# Patient Record
Sex: MALE | Race: WHITE | Employment: FULL TIME | URBAN - METROPOLITAN AREA
[De-identification: names, ages, dates, MRNs, and addresses within clinical notes are randomized per-mention and may not be internally consistent; named-entity substitution may affect disease eponyms.]

---

## 2017-07-26 ENCOUNTER — HOSPITAL ENCOUNTER (OUTPATIENT)
Dept: CT IMAGING | Age: 52
Discharge: HOME | End: 2017-07-26
Attending: ORTHOPAEDIC SURGERY | Admitting: ORTHOPAEDIC SURGERY

## 2017-07-26 DIAGNOSIS — M54.12 RADICULOPATHY OF CERVICAL REGION: ICD-10-CM

## 2017-07-26 DIAGNOSIS — M54.12 BRACHIAL NEURITIS OR RADICULITIS NOS: ICD-10-CM

## 2018-08-23 ENCOUNTER — HOSPITAL ENCOUNTER (OUTPATIENT)
Age: 53
Discharge: HOME | End: 2018-08-23
Payer: COMMERCIAL

## 2018-08-23 LAB — POTASSIUM SERPL-SCNC: 3.5 MMOL/L (ref 3.5–5.1)

## 2018-08-23 PROCEDURE — 36415 COLL VENOUS BLD VENIPUNCTURE: CPT

## 2018-08-23 PROCEDURE — 84132 ASSAY OF SERUM POTASSIUM: CPT

## 2019-04-30 ENCOUNTER — OFFICE VISIT (OUTPATIENT)
Dept: ORTHOPEDIC SURGERY | Age: 54
End: 2019-04-30
Payer: COMMERCIAL

## 2019-04-30 VITALS
DIASTOLIC BLOOD PRESSURE: 140 MMHG | SYSTOLIC BLOOD PRESSURE: 176 MMHG | WEIGHT: 220 LBS | BODY MASS INDEX: 29.8 KG/M2 | HEIGHT: 72 IN | HEART RATE: 90 BPM

## 2019-04-30 DIAGNOSIS — M75.22 BICEPS TENDONITIS ON LEFT: Primary | ICD-10-CM

## 2019-04-30 DIAGNOSIS — M25.512 LEFT SHOULDER PAIN, UNSPECIFIED CHRONICITY: ICD-10-CM

## 2019-04-30 PROCEDURE — 99204 OFFICE O/P NEW MOD 45 MIN: CPT | Performed by: ORTHOPAEDIC SURGERY

## 2019-04-30 NOTE — PROGRESS NOTES
[address]  Left  Upper Extremity Pain    Chief Complaint    Shoulder Pain [facility]FIN[facility] NP left shoulder)      DOS: August 24, 2018 left shoulder labral debridement chondroplasty with subacromial decompression as well as distal clavicle excision performed by Dr. Riana Pat at [facility]    Pain Assessment  Location of Pain: Shoulder  Location Modifiers: Left  Severity of Pain: 8  Quality of Pain: Sharp, Aching  Duration of Pain: Persistent  Frequency of Pain: Intermittent  Aggravating Factors: Other (Comment)(raise/rotate)  Limiting Behavior: Yes  Result of Injury: Yes  Work-Related Injury: Yes  Are there other pain locations you wish to document?: No    History of Present Illness:  Coy Griffiths is a 48 y.o. RHD male  presenting today for a second opinion evaluation for sequelae of a work related injury and subsequent surgery. 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 He was initially seen at [address] [place] [address] and had complaints of both neck and shoulder pain. He had a trial of physical therapy and NSAIDs, but his pain persisted. It appeared at the time that his neck was most painful. He had had previous cervical spine surgery by Dr. Mata Ledesma at [address] [place] [address] and so initially he was referred there. Dr. Mata Ledesma felt there was nothing he could do about this cervical spine. He then saw Dr. Olga Ac for diagnosed a persisten pseudarthrosis and recommended revision surgery. This was subsequently performed. He had residual left shoulder pain and was referred to Dr. Riana Pat. An MRI demonstrated some tendinosis and AC joint arthritis and so he underwent a left shoulder arthroscopic surgery having failed conservative measures. The patient reports that after the surgery continue to have pain localizing to the front of his left shoulder.  This persisted despite extensive rehab as well as repeat corticosteroid injections into the bicipital groove that were ultrasound guided. He continued to have pain. He then began noticing some snapping on the anterior aspect of his shoulder particularly with external rotation of the arm. To date he cannot sleep on that side. On December 20, 2018 a repeat MRI postoperatively was performed and he was diagnosing him with rotator cuff tendinosis as well as postsurgical changes within the glenohumeral joint as well as some fraying of the upper border of the subscapularis. Dr. Gerald Becerril recommended no further intervention. The patient presents today because he has requested a second opinion. This has been arranged through the bureau of workman's comp. He has some residual numbness particularly distal to the elbow that had persisted through these cervical spine surgeries, although it has not worsened. The patient has been instructed to avoid lifting more than 10 pounds but is continued to do home exercises to maintain his range of motion but he does so with some pain particularly at the endpoints of his excursion. Actively he has quite a bit of difficulty lifting above the horizontal.       Medical History:    Musculoskeletal ROS: positive for - joint pain and joint stiffness      Patient's medications, allergies, past medical, surgical, social and family histories were reviewed and updated as appropriate.     Past Medical History:   Diagnosis Date    Hypertension       Past Surgical History:   Procedure Laterality Date    NECK SURGERY      x2    SHOULDER SURGERY Left 08/25/2018     Social History     Socioeconomic History    Marital status: Single     Spouse name: Not on file    Number of children: Not on file    Years of education: Not on file    Highest education level: Not on file   Occupational History    Not on file   Social Needs    Financial resource strain: Not on file    Food insecurity:     Worry: Not on file Inability: Not on file    Transportation needs:     Medical: Not on file     Non-medical: Not on file   Tobacco Use    Smoking status: Never Smoker    Smokeless tobacco: Never Used   Substance and Sexual Activity    Alcohol use: Yes    Drug use: Never    Sexual activity: Not on file   Lifestyle    Physical activity:     Days per week: Not on file     Minutes per session: Not on file    Stress: Not on file   Relationships    Social connections:     Talks on phone: Not on file     Gets together: Not on file     Attends Protestant service: Not on file     Active member of club or organization: Not on file     Attends meetings of clubs or organizations: Not on file     Relationship status: Not on file    Intimate partner violence:     Fear of current or ex partner: Not on file     Emotionally abused: Not on file     Physically abused: Not on file     Forced sexual activity: Not on file   Other Topics Concern    Not on file   Social History Narrative    Not on file       No Known Allergies  No current outpatient medications on file prior to visit. No current facility-administered medications on file prior to visit.           Review of Systems:  A 14 point review of systems available in the scanned medical record, dated 4/30/2019  , under the media tab, as documented by the patient. The review is negative with the exception of those things mentioned in the HPI and Past Medical History. Vital Signs:  Vitals:    04/30/19 1353   BP: (!) 176/140   Pulse: 90       General Exam:   Well nourished, and groomed. Awake, Alert, Oriented to Person/Place/Time  No acute distress    Upper Extremity:    Left shoulder exam    Inspection:  No gross deformities, periscapular musculature is symmetry without atrophy, no obvious winging. Surgical incisions are healed     Palpation: He has no tenderness along the posterior capsule. He has no tenderness along the glenohumeral joint anteriorly. No a.c. joint tenderness.   He does exhibit some tenderness over the anterior rotator cuff as well as exquisite tenderness over the bicipital groove. Active/Passive ROM:     Actively he can only forward elevate to approximately 130° and abduction to 90°. Passively he can be improved to 150° roughly in both planes. External rotation with arm at the side is 50°. This can be improved passively to 55°. Internal rotation is to midline sacrum. Cross body adduction is limited to approximately 10 cm above the chest in addition he has pain particularly over the bicipital groove during this maneuver. Strength: 4-/5 strength testing of Supra and infraspinatus. 4+/5 subscap    Stability: negative sulcus sign, no evidence of instability    Neurovascular:   2+ Radial pulses with capillary refill brisk <2 seconds. 2/2 sensation to light touch in C5-T1 distributions tested. Special Tests: Positive impingement signs. Positive Neer's and Hawkin. Positive Ramy's. Positive Riverdale's. Positive speeds. Positive Yergason's. Right comparison shoulder exam    Inspection:  No gross deformities, periscapular musculature is symmetrical without atrophy, no obvious winging    Palpation: Nontender to palpation about the acromioclavicular joint, rotator cuff footprint or over the biceps groove, or any other bony architecture    Active/Passive ROM: full in forward flexion, abduction, external rotation with the elbow at the side, and internal rotation    Strength: 5/5 strength testing of deltoid, supraspinatus, infraspinatus, teres minor, and subscapularis    Stability: negative sulcus sign, no evidence of instability    Neurovascular: Neurovascularly intact      Imaging:     AP, scapular Y, axillary lateral of the left shoulder demonstrates some mild glenohumeral arthritic changes as well as postsurgical changes involving resection of the a.c. joint with some residual superior osteophyte. Postsurgical changes involving the subacromial space.   No acute fracture dislocation    Multisequence multiplanar MRI dated on December 20, 2018 demonstrated some postsurgical changes involving the superior posterior and anterior labrum as well as the upper border of the subscapularis consistent with prior surgical history. There is biceps tendinosis. Considerable amount of fluid within the midportion and distal biceps as well as edema within the tendon itself. Proximally the tendon is medialized and possibly approached over the medial aspect of the bicipital groove. The cuff tendinosis and continuing a.c. joint arthritis and inflammation though post-surgical changes are present    Office Procedures:  Orders Placed This Encounter   Procedures    XR SHOULDER LEFT (MIN 2 VIEWS)     Standing Status:   Future     Number of Occurrences:   1     Standing Expiration Date:   4/30/2020     Order Specific Question:   Reason for exam:     Answer:   pain       Assessment :  Jay Jay A Advance residual biceps tendinopathy with possible degenerative/attritional SLAP tear. This is not likely to improve with additional conservative measures. He is candidate for arthroscopy and open biceps tenodesis of the left shoulder    Impression:  Encounter Diagnoses   Name Primary?  Left shoulder pain, unspecified chronicity     Biceps tendonitis on left Yes         Plan:      1) Recommend revision surgery involving open sub-pec biceps tenodesis, revision DCE, debridement, and possibly subscap repair  2) Will send our results to work comp for approval  3) Patient will require 3-4 months of physical therapy followed by work conditioning at the tail end for approximately 4-8 weeks. 4) Work restrictions presently include sedentary work with lifting no more than 5 pounds. Greater than 45 minutes was expended reviewing all available records and discussing treatment options and timeline to recovery as well as alternatives to revision surgery. Viv Blancas MD  Fellow, Orthopedic Sports [address] [place] [address]  Date:    4/30/2019    The encounter with Irlanda Fernando was supervised by Dr Idania Ivy, who personally examined the patient and reviewed the plan. This dictation was performed with a verbal recognition program (DRAGON) and it was checked for errors. It is possible that there are still dictated errors within this office note. If so, please bring any errors to my attention for an addendum. All efforts were made to ensure that this office note is accurate.   ________________  I was physically present and personally supervised the Orthopaedic Sports Medicine Fellow in the evaluation and development of a treatment plan for this patient. I personally interviewed the patient and performed a physical examination. In addition, I discussed the patient's condition and treatment options with them. I have also reviewed and agree with the past medical, family and social history unless otherwise noted. All of the patient's questions were answered. Jojo Ivy MD, PhD  4/30/2019

## 2019-05-08 ENCOUNTER — TELEPHONE (OUTPATIENT)
Dept: ORTHOPEDIC SURGERY | Age: 54
End: 2019-05-08

## 2019-05-08 NOTE — TELEPHONE ENCOUNTER
WC WORKABILITY NOTE was not completed at the time of the office visit 4/30/19 and is now being requested by Colette  . Notified Sreekanth cannon/Dr. Anna[facility] clinic    Once notified the Workability Note has been completed I will forward to Patricia.

## 2019-05-22 ENCOUNTER — TELEPHONE (OUTPATIENT)
Dept: ORTHOPEDIC SURGERY | Age: 54
End: 2019-05-22

## 2019-05-22 NOTE — LETTER
[facility]  Surgery Precert & Billing Form:    DEMOGRAPHICS:                                                                                                       Patient Name:  Coy Griffiths  Patient :  1965   Patient SS#:      Patient Phone:  [phone] (home) [phone] (work) Alt. Patient Phone:    Patient Address:  [address] [place] [address]    PCP:  No primary care provider on file. Insurance: Work comp     DIAGNOSIS & PROCEDURE:                                                                                      Diagnosis:   Biceps tendonitis on left  - Primary M75.22    Left shoulder pain, unspecified chronicity  M25.512        Operation: left    SURGERY  INFORMATION  Date of Surgery:      Location:       Type:    Outpatient  23 hour hold:  No  Surgeon:        Kalyn Shore.  Mary Garcia MD         19     BILLING INFORMATION:                                                                                                Physician Procedure                                            CPT Codes                      PA, or Fellow Procedure                                      CPT Codes                      Precert information:

## 2019-05-22 NOTE — TELEPHONE ENCOUNTER
PATIENT CALLED STATING HE NEEDS A LETTER FROM DR. NOYOLA TO [facility] FOR HIM TO HAVE SURGERY.  PATIENT CAN BE REACHED AT PH# [phone]

## 2019-06-11 ENCOUNTER — OFFICE VISIT (OUTPATIENT)
Dept: ORTHOPEDIC SURGERY | Age: 54
End: 2019-06-11
Payer: COMMERCIAL

## 2019-06-11 VITALS
WEIGHT: 220.02 LBS | BODY MASS INDEX: 29.8 KG/M2 | HEIGHT: 72 IN | SYSTOLIC BLOOD PRESSURE: 165 MMHG | DIASTOLIC BLOOD PRESSURE: 115 MMHG | HEART RATE: 75 BPM

## 2019-06-11 DIAGNOSIS — M25.512 LEFT SHOULDER PAIN, UNSPECIFIED CHRONICITY: ICD-10-CM

## 2019-06-11 DIAGNOSIS — M75.22 BICEPS TENDONITIS ON LEFT: Primary | ICD-10-CM

## 2019-06-11 PROCEDURE — 99213 OFFICE O/P EST LOW 20 MIN: CPT | Performed by: ORTHOPAEDIC SURGERY

## 2019-06-11 RX ORDER — CETIRIZINE HYDROCHLORIDE 10 MG/1
10 TABLET ORAL DAILY
COMMUNITY
End: 2019-07-25

## 2019-06-11 RX ORDER — LISINOPRIL 10 MG/1
1 TABLET ORAL
COMMUNITY
End: 2019-07-25

## 2019-06-11 NOTE — PROGRESS NOTES
[facility]  History and Physical  Shoulder Pain    Date:  2019    Name:  Bharath Noyola  Address:  [address]    :  1965      Age:   48 y.o.    SSN:  [id number]      Medical Record Number:  [id number]    Reason for Visit:    Pre-op Exam (Left shoulder)      HPI:   Bharath Noyola is a 48 y.o. male who presents to our office today complaining of  left shoulder pain. There is a request for left shoulder surgery with his Workmen's Comp. which they have finally improved. Patient presents today stating that he continues to have persistent symptoms of pain. He denies any new injury since his last visit. 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 He was initially seen at [address] [place] [address] and had complaints of both neck and shoulder pain. He had a trial of physical therapy and NSAIDs, but his pain persisted. It appeared at the time that his neck was most painful. He had had previous cervical spine surgery by Dr. Justine Snyder at [facility] and so initially he was referred there. Dr. Justine Snyder felt there was nothing he could do about this cervical spine. He then saw Dr. Jace Palacios for diagnosed a persistent pseudarthrosis and recommended revision surgery. This was subsequently performed. He had residual left shoulder pain and was referred to Dr. Dima Esparza. An MRI demonstrated some tendinosis and AC joint arthritis and so he underwent a left shoulder arthroscopic surgery having failed conservative measures. The patient reports that after the surgery continue to have pain localizing to the front of his left shoulder. This persisted despite extensive rehab as well as repeat corticosteroid injections into the bicipital groove that were ultrasound guided. He continued to have pain.   He groove and rotator cuff. Active Range of Motion: Forward elevation of 120 with pain past that, abduction of 110, external rotation with elbow at the side 35, internal rotation to the back is T12    Passive Range of Motion: Passively forward elevation can be further increased to 130. Strength:  +4/5 supraspinatus with pain inhibition. 5/5 external rotation with resistance. Special Tests:positive pain over the biceps with crossarm addiction. No Darío muscle deformity. Neurovascular: Sensation to light touch is intact, no motor deficits, palpable radial pulses 2+    Neuro: alert. oriented  Eyes: Extra-ocular muscles intact  Mouth: Oral mucosa moist. No perioral lesions  Pulm: Respirations unlabored and regular. Skin: warm, well perfused    Additional Examinations:    Examination of the contralateral extremity does not show any tenderness, deformity or injury. Range of motion is unremarkable. There is no gross instability. There are no rashes, ulcerations or lesions. Strength and tone are normal.    Assessment:  Radhames Bryant is a 48y.o. year old male with left shoulder pain related to with chronic biceps tendinosis. Impression:  Encounter Diagnoses   Name Primary?  Biceps tendonitis on left Yes    Left shoulder pain, unspecified chronicity        Office Procedures:  No orders of the defined types were placed in this encounter. Plan:   We discussed the surgery in full detail. We recommend left shoulder arthroscopic vs. open biceps tenodesis, possible distal clavicle excision, possible revision subscapularis repair. We recommend that he wait until after his [place] trip. Risks, benefits and potential complications of arthroscopic shoulder surgery were discussed with the patient.   Risks discussed include but are not limited to bleeding, infection, anesthetic risk, injury to nerves and blood vessels, deep vein thrombosis, residual stiffness and weakness, and the need for revision surgery. The patient also understands that anesthetic risks include cardiopulmonary issues, drug reactions and even death. The patient voices an understanding of the importance of physical therapy and home exercises after surgery. All questions were answered and written informed consent for surgery was obtained today. 6/13/2019  2:50 PM      Perla hCacon PA-C  Orthopaedic Sports Medicine Physician Assistant    During this examination, Perla ALBERTO PA-C, functioned as a scribe for Dr. Juana Quiroz. This dictation was performed with a verbal recognition program (DRAGON) and it was checked for errors. It is possible that there are still dictated errors within this office note. If so, please bring any errors to my attention for an addendum. All efforts were made to ensure that this office note is accurate.  _________________  I, Dr. Juana Quiroz, personally performed the services described in this documentation as described by Perla Chacon PA-C in my presence, and it is both accurate and complete. Jojo Blake MD, PhD  6/11/2019

## 2019-06-13 ENCOUNTER — TELEPHONE (OUTPATIENT)
Dept: ORTHOPEDIC SURGERY | Age: 54
End: 2019-06-13

## 2019-06-13 NOTE — TELEPHONE ENCOUNTER
FAXED [facility] [address] [facility] CARDIOVASCULAR DE [place] [address]IB[address] HUA WOODALL) MEDICAL RECORDS FROM  MAY/JUNE 2019 TO Apollo Lin @ [phone]. ALSO RECEIVED APS FROM [employer]. CAN NOT BE FILLED OUT UNTIL APPROVAL SX DATE FROM [facility].       PUT IN TRAY

## 2019-06-13 NOTE — TELEPHONE ENCOUNTER
GAVE [facility] CENTRO CARDIOVASCULAR DE [place] [address] HUA WOODALL) MEDICAL RECORDS FROM 7/7/16 TO THE PRESENT TO Marco Mncair TO SCAN INTO MRO FOR Arden Oakes. REQUESTED BILLING FROM [facility] BILLING.

## 2019-06-27 ENCOUNTER — TELEPHONE (OUTPATIENT)
Dept: ORTHOPEDIC SURGERY | Age: 54
End: 2019-06-27

## 2019-07-25 ENCOUNTER — TELEPHONE (OUTPATIENT)
Dept: ORTHOPEDIC SURGERY | Age: 54
End: 2019-07-25

## 2019-07-25 ENCOUNTER — OFFICE VISIT (OUTPATIENT)
Dept: ORTHOPEDIC SURGERY | Age: 54
End: 2019-07-25
Payer: COMMERCIAL

## 2019-07-25 VITALS
HEIGHT: 72 IN | WEIGHT: 220.02 LBS | HEART RATE: 73 BPM | SYSTOLIC BLOOD PRESSURE: 144 MMHG | DIASTOLIC BLOOD PRESSURE: 91 MMHG | BODY MASS INDEX: 29.8 KG/M2

## 2019-07-25 DIAGNOSIS — M25.512 LEFT SHOULDER PAIN, UNSPECIFIED CHRONICITY: ICD-10-CM

## 2019-07-25 DIAGNOSIS — M75.22 BICEPS TENDONITIS ON LEFT: Primary | ICD-10-CM

## 2019-07-25 PROBLEM — M96.0 PSEUDARTHROSIS FOLLOWING SPINAL FUSION: Status: ACTIVE | Noted: 2017-02-17

## 2019-07-25 PROBLEM — M96.0 PSEUDARTHROSIS AFTER FUSION OR ARTHRODESIS: Status: ACTIVE | Noted: 2017-02-17

## 2019-07-25 PROBLEM — S12.601K: Status: ACTIVE | Noted: 2017-02-06

## 2019-07-25 PROBLEM — Z98.1 HX OF FUSION OF CERVICAL SPINE: Status: ACTIVE | Noted: 2017-02-06

## 2019-07-25 PROBLEM — M54.12 CERVICAL RADICULOPATHY: Status: ACTIVE | Noted: 2017-02-17

## 2019-07-25 PROCEDURE — 99213 OFFICE O/P EST LOW 20 MIN: CPT | Performed by: ORTHOPAEDIC SURGERY

## 2019-07-25 RX ORDER — LISINOPRIL AND HYDROCHLOROTHIAZIDE 12.5; 1 MG/1; MG/1
TABLET ORAL
COMMUNITY
Start: 2019-07-03

## 2019-07-25 NOTE — PROGRESS NOTES
Patient has had no medical changes since last evaluated
both accurate and complete. Jojo Shetty MD PhD  7/25/2019

## 2019-08-01 NOTE — PROGRESS NOTES
[facility] / [facility] ([facility]) Jocelyn Tse, [address]    Acknowledgment of Informed Consent for Surgical or Medical Procedure and Sedation  I agree to allow doctor(s) [id number] [facility] and his/her associates or assistants, including residents and/or other qualified medical practitioner to perform the following medical treatment or procedure and to administer or direct the administration of sedation as necessary:  Procedure(s): LEFT SHOULDER ARTHROSCOPIC VERSUS OPEN BICEPS TENODESIS, POSSIBLE BICEPS TENODESIS, WONG, POSSIBLE REVISION SUBSCAPULARIS  REPAIR  My doctor has explained the following regarding the proposed procedure:   the explanation of the procedure   the benefits of the procedure   the potential problems that might occur during recuperation   the risks and side effects of the procedure which could include but are not limited to severe blood loss, infection, stroke or death   the benefits, risks and side effect of alternative procedures including the consequences of declining this procedure or any alternative procedures   the likelihood of achieving satisfactory results. I acknowledge no guarantee or assurance has been made to me regarding the results. I understand that during the course of this treatment/procedure, unforeseen conditions can occur which require an additional or different procedure. I agree to allow my physician or assistants to perform such extension of the original procedure as they may find necessary. I understand that sedation will often result in temporary impairment of memory and fine motor skills and that sedation can occasionally progress to a state of deep sedation or general anesthesia. I understand the risks of anesthesia for surgery include, but are not limited to, sore throat, hoarseness, injury to face, mouth, or teeth; nausea; headache; injury to blood vessels or nerves; death, brain damage, or paralysis.     I understand that if I

## 2019-08-07 DIAGNOSIS — M25.512 LEFT SHOULDER PAIN, UNSPECIFIED CHRONICITY: Primary | ICD-10-CM

## 2019-08-09 ENCOUNTER — TELEPHONE (OUTPATIENT)
Dept: ORTHOPEDIC SURGERY | Age: 54
End: 2019-08-09

## 2019-08-14 ENCOUNTER — TELEPHONE (OUTPATIENT)
Dept: ORTHOPEDIC SURGERY | Age: 54
End: 2019-08-14

## 2019-08-15 ENCOUNTER — ANESTHESIA EVENT (OUTPATIENT)
Dept: OPERATING ROOM | Age: 54
End: 2019-08-15
Payer: COMMERCIAL

## 2019-08-15 ENCOUNTER — TELEPHONE (OUTPATIENT)
Dept: ORTHOPEDIC SURGERY | Age: 54
End: 2019-08-15

## 2019-08-15 NOTE — PROGRESS NOTES
PRE-OP INSTRUCTIONS FOR THE SURGICAL PATIENT YOU ARE UNABLE TO MAKE CONTACT FOR AN INTERVIEW:      1. Follow instructions for your ARRIVAL TIME as DIRECTED BY YOUR SURGEON. 2. Enter the MAIN entrance located on [employer] and report to the desk. 3. Bring your insurance & prescription card and photo ID with you. You may also be asked to pay a co-pay, as you may want to bring a check or credit card with you. 4. Leave all other valuables at home. 5. Arrange for someone to drive you home and be with you for the first 24 hours after discharge. 6. You must contact your surgeon for Instructions regarding:         - ALL medication instructions, especially if taking blood thinners, aspirin, or diabetic medication.  - IF  there is a change in your physical condition such as a cold, fever, rash, cuts, sores or any other infection, especially near your surgical site. 7. A Pre-op History and Physical for surgery MUST be completed by your Physician or an Urgent Care within 30 days of your procedure date. Please bring a copy with you on the day of your procedure and along with any other testing performed. 8. DO NOT EAT ANYTHING eight hours prior to surgery. May have up to 8 ounces of water 4 hours prior to surgery. 9. No gum, candy, mints, or ice chips day of procedure. 10. Please refrain from drinking alcohol the day before or day of your procedure. 11. Please do not smoke the day of your procedure. 12. Dress in loose, comfortable clothing appropriate for redressing after your procedure. Do not wear jewelry (including body piercings), make-up, fingernail polish, lotion, powders or metal hairclips. 15. Contacts will need to be removed prior to surgery. You may want to bring your            Eye glasses to wear immediately before and after surgery. 14. Dentures will need to be removed before your procedure.    13. Bring cases for your glasses, contacts, dentures, or hearing aids to protect them while you are in surgery. 16. If you use a CPAP, please bring it with you on the day of your procedure. 17. Do not shave or wax for 72 hours prior to procedure near your operative site  18. FOR WOMAN OF CHILDBEARING AGE ONLY- please bring a urine sample with you on day of surgery or make sure we can collect on arrival.    If you have further questions, you may contact us at [phone]    Left instructions on patient's voicemail. Cassie Spear. 8/15/2019 .9:09 AM

## 2019-08-16 ENCOUNTER — ANESTHESIA (OUTPATIENT)
Dept: OPERATING ROOM | Age: 54
End: 2019-08-16
Payer: COMMERCIAL

## 2019-08-16 ENCOUNTER — HOSPITAL ENCOUNTER (OUTPATIENT)
Age: 54
Setting detail: OUTPATIENT SURGERY
Discharge: HOME | End: 2019-08-16
Attending: ORTHOPAEDIC SURGERY | Admitting: ORTHOPAEDIC SURGERY
Payer: COMMERCIAL

## 2019-08-16 VITALS — DIASTOLIC BLOOD PRESSURE: 76 MMHG | SYSTOLIC BLOOD PRESSURE: 119 MMHG | OXYGEN SATURATION: 96 % | TEMPERATURE: 96.8 F

## 2019-08-16 VITALS
SYSTOLIC BLOOD PRESSURE: 165 MMHG | OXYGEN SATURATION: 94 % | WEIGHT: 205 LBS | DIASTOLIC BLOOD PRESSURE: 119 MMHG | HEIGHT: 72 IN | BODY MASS INDEX: 27.77 KG/M2 | HEART RATE: 97 BPM | TEMPERATURE: 97.8 F | RESPIRATION RATE: 12 BRPM

## 2019-08-16 DIAGNOSIS — Z98.890 H/O ARTHROSCOPY OF SHOULDER: Primary | ICD-10-CM

## 2019-08-16 PROCEDURE — 7100000011 HC PHASE II RECOVERY - ADDTL 15 MIN: Performed by: ORTHOPAEDIC SURGERY

## 2019-08-16 PROCEDURE — 7100000010 HC PHASE II RECOVERY - FIRST 15 MIN: Performed by: ORTHOPAEDIC SURGERY

## 2019-08-16 PROCEDURE — 6360000002 HC RX W HCPCS

## 2019-08-16 PROCEDURE — 6360000002 HC RX W HCPCS: Performed by: ANESTHESIOLOGY

## 2019-08-16 PROCEDURE — 3700000000 HC ANESTHESIA ATTENDED CARE: Performed by: ORTHOPAEDIC SURGERY

## 2019-08-16 PROCEDURE — 3700000001 HC ADD 15 MINUTES (ANESTHESIA): Performed by: ORTHOPAEDIC SURGERY

## 2019-08-16 PROCEDURE — 2580000003 HC RX 258: Performed by: ANESTHESIOLOGY

## 2019-08-16 PROCEDURE — 2720000010 HC SURG SUPPLY STERILE: Performed by: ORTHOPAEDIC SURGERY

## 2019-08-16 PROCEDURE — 2709999900 HC NON-CHARGEABLE SUPPLY: Performed by: ORTHOPAEDIC SURGERY

## 2019-08-16 PROCEDURE — 6360000002 HC RX W HCPCS: Performed by: ORTHOPAEDIC SURGERY

## 2019-08-16 PROCEDURE — 64415 NJX AA&/STRD BRCH PLXS IMG: CPT | Performed by: ANESTHESIOLOGY

## 2019-08-16 PROCEDURE — 3600000004 HC SURGERY LEVEL 4 BASE: Performed by: ORTHOPAEDIC SURGERY

## 2019-08-16 PROCEDURE — 2500000003 HC RX 250 WO HCPCS: Performed by: ORTHOPAEDIC SURGERY

## 2019-08-16 PROCEDURE — 2500000003 HC RX 250 WO HCPCS: Performed by: NURSE ANESTHETIST, CERTIFIED REGISTERED

## 2019-08-16 PROCEDURE — 2580000003 HC RX 258: Performed by: ORTHOPAEDIC SURGERY

## 2019-08-16 PROCEDURE — L3650 SO 8 ABD RESTRAINT PRE OTS: HCPCS | Performed by: ORTHOPAEDIC SURGERY

## 2019-08-16 PROCEDURE — 3600000014 HC SURGERY LEVEL 4 ADDTL 15MIN: Performed by: ORTHOPAEDIC SURGERY

## 2019-08-16 PROCEDURE — 7100000001 HC PACU RECOVERY - ADDTL 15 MIN: Performed by: ORTHOPAEDIC SURGERY

## 2019-08-16 PROCEDURE — 6360000002 HC RX W HCPCS: Performed by: NURSE ANESTHETIST, CERTIFIED REGISTERED

## 2019-08-16 PROCEDURE — C1713 ANCHOR/SCREW BN/BN,TIS/BN: HCPCS | Performed by: ORTHOPAEDIC SURGERY

## 2019-08-16 PROCEDURE — 7100000000 HC PACU RECOVERY - FIRST 15 MIN: Performed by: ORTHOPAEDIC SURGERY

## 2019-08-16 PROCEDURE — 6370000000 HC RX 637 (ALT 250 FOR IP): Performed by: ANESTHESIOLOGY

## 2019-08-16 DEVICE — SCREW INTFR L15MM DIA5.5MM W/ SZ 2 FIBERWIRE SUT AND DISP: Type: IMPLANTABLE DEVICE | Site: SHOULDER | Status: FUNCTIONAL

## 2019-08-16 RX ORDER — SCOLOPAMINE TRANSDERMAL SYSTEM 1 MG/1
1 PATCH, EXTENDED RELEASE TRANSDERMAL ONCE
Status: DISCONTINUED | OUTPATIENT
Start: 2019-08-16 | End: 2019-08-16 | Stop reason: HOSPADM

## 2019-08-16 RX ORDER — PROMETHAZINE HYDROCHLORIDE 25 MG/ML
6.25 INJECTION, SOLUTION INTRAMUSCULAR; INTRAVENOUS
Status: DISCONTINUED | OUTPATIENT
Start: 2019-08-16 | End: 2019-08-16 | Stop reason: HOSPADM

## 2019-08-16 RX ORDER — OXYCODONE HYDROCHLORIDE AND ACETAMINOPHEN 5; 325 MG/1; MG/1
1 TABLET ORAL EVERY 4 HOURS PRN
Qty: 28 TABLET | Refills: 0 | Status: SHIPPED | OUTPATIENT
Start: 2019-08-16 | End: 2019-08-23

## 2019-08-16 RX ORDER — ASPIRIN 325 MG
325 TABLET, DELAYED RELEASE (ENTERIC COATED) ORAL DAILY
Qty: 30 TABLET | Refills: 0 | Status: SHIPPED | OUTPATIENT
Start: 2019-08-16 | End: 2019-09-24

## 2019-08-16 RX ORDER — MORPHINE SULFATE 4 MG/ML
1 INJECTION, SOLUTION INTRAMUSCULAR; INTRAVENOUS EVERY 5 MIN PRN
Status: DISCONTINUED | OUTPATIENT
Start: 2019-08-16 | End: 2019-08-16 | Stop reason: HOSPADM

## 2019-08-16 RX ORDER — ESMOLOL HYDROCHLORIDE 10 MG/ML
INJECTION INTRAVENOUS PRN
Status: DISCONTINUED | OUTPATIENT
Start: 2019-08-16 | End: 2019-08-16 | Stop reason: SDUPTHER

## 2019-08-16 RX ORDER — MEPERIDINE HYDROCHLORIDE 25 MG/ML
12.5 INJECTION INTRAMUSCULAR; INTRAVENOUS; SUBCUTANEOUS EVERY 5 MIN PRN
Status: DISCONTINUED | OUTPATIENT
Start: 2019-08-16 | End: 2019-08-16 | Stop reason: HOSPADM

## 2019-08-16 RX ORDER — ROCURONIUM BROMIDE 10 MG/ML
INJECTION, SOLUTION INTRAVENOUS PRN
Status: DISCONTINUED | OUTPATIENT
Start: 2019-08-16 | End: 2019-08-16 | Stop reason: SDUPTHER

## 2019-08-16 RX ORDER — GLYCOPYRROLATE 0.2 MG/ML
INJECTION INTRAMUSCULAR; INTRAVENOUS PRN
Status: DISCONTINUED | OUTPATIENT
Start: 2019-08-16 | End: 2019-08-16 | Stop reason: SDUPTHER

## 2019-08-16 RX ORDER — ONDANSETRON 2 MG/ML
INJECTION INTRAMUSCULAR; INTRAVENOUS
Status: COMPLETED
Start: 2019-08-16 | End: 2019-08-16

## 2019-08-16 RX ORDER — OXYCODONE HYDROCHLORIDE 5 MG/1
5 TABLET ORAL PRN
Status: COMPLETED | OUTPATIENT
Start: 2019-08-16 | End: 2019-08-16

## 2019-08-16 RX ORDER — DIPHENHYDRAMINE HYDROCHLORIDE 50 MG/ML
12.5 INJECTION INTRAMUSCULAR; INTRAVENOUS
Status: DISCONTINUED | OUTPATIENT
Start: 2019-08-16 | End: 2019-08-16 | Stop reason: HOSPADM

## 2019-08-16 RX ORDER — FENTANYL CITRATE 50 UG/ML
100 INJECTION, SOLUTION INTRAMUSCULAR; INTRAVENOUS ONCE
Status: COMPLETED | OUTPATIENT
Start: 2019-08-16 | End: 2019-08-16

## 2019-08-16 RX ORDER — MIDAZOLAM HYDROCHLORIDE 1 MG/ML
INJECTION INTRAMUSCULAR; INTRAVENOUS
Status: COMPLETED
Start: 2019-08-16 | End: 2019-08-16

## 2019-08-16 RX ORDER — ONDANSETRON 2 MG/ML
INJECTION INTRAMUSCULAR; INTRAVENOUS PRN
Status: DISCONTINUED | OUTPATIENT
Start: 2019-08-16 | End: 2019-08-16 | Stop reason: SDUPTHER

## 2019-08-16 RX ORDER — METOCLOPRAMIDE HYDROCHLORIDE 5 MG/ML
10 INJECTION INTRAMUSCULAR; INTRAVENOUS
Status: COMPLETED | OUTPATIENT
Start: 2019-08-16 | End: 2019-08-16

## 2019-08-16 RX ORDER — ROPIVACAINE HYDROCHLORIDE 5 MG/ML
INJECTION, SOLUTION EPIDURAL; INFILTRATION; PERINEURAL PRN
Status: DISCONTINUED | OUTPATIENT
Start: 2019-08-16 | End: 2019-08-16 | Stop reason: SDUPTHER

## 2019-08-16 RX ORDER — LABETALOL 20 MG/4 ML (5 MG/ML) INTRAVENOUS SYRINGE
5 EVERY 10 MIN PRN
Status: DISCONTINUED | OUTPATIENT
Start: 2019-08-16 | End: 2019-08-16 | Stop reason: HOSPADM

## 2019-08-16 RX ORDER — MIDAZOLAM HYDROCHLORIDE 1 MG/ML
2 INJECTION INTRAMUSCULAR; INTRAVENOUS ONCE
Status: DISCONTINUED | OUTPATIENT
Start: 2019-08-16 | End: 2019-08-16 | Stop reason: SDUPTHER

## 2019-08-16 RX ORDER — MIDAZOLAM HYDROCHLORIDE 1 MG/ML
2 INJECTION INTRAMUSCULAR; INTRAVENOUS ONCE
Status: COMPLETED | OUTPATIENT
Start: 2019-08-16 | End: 2019-08-16

## 2019-08-16 RX ORDER — OXYCODONE HYDROCHLORIDE 5 MG/1
10 TABLET ORAL PRN
Status: COMPLETED | OUTPATIENT
Start: 2019-08-16 | End: 2019-08-16

## 2019-08-16 RX ORDER — LIDOCAINE HYDROCHLORIDE 20 MG/ML
INJECTION, SOLUTION INFILTRATION; PERINEURAL PRN
Status: DISCONTINUED | OUTPATIENT
Start: 2019-08-16 | End: 2019-08-16 | Stop reason: SDUPTHER

## 2019-08-16 RX ORDER — AMOXICILLIN 250 MG
2 CAPSULE ORAL DAILY PRN
Qty: 30 TABLET | Refills: 1 | Status: SHIPPED | OUTPATIENT
Start: 2019-08-16 | End: 2019-09-24

## 2019-08-16 RX ORDER — BUPIVACAINE HYDROCHLORIDE 5 MG/ML
INJECTION, SOLUTION EPIDURAL; INTRACAUDAL PRN
Status: DISCONTINUED | OUTPATIENT
Start: 2019-08-16 | End: 2019-08-16 | Stop reason: ALTCHOICE

## 2019-08-16 RX ORDER — ROPIVACAINE HYDROCHLORIDE 5 MG/ML
30 INJECTION, SOLUTION EPIDURAL; INFILTRATION; PERINEURAL ONCE
Status: DISCONTINUED | OUTPATIENT
Start: 2019-08-16 | End: 2019-08-16 | Stop reason: HOSPADM

## 2019-08-16 RX ORDER — FENTANYL CITRATE 50 UG/ML
INJECTION, SOLUTION INTRAMUSCULAR; INTRAVENOUS PRN
Status: DISCONTINUED | OUTPATIENT
Start: 2019-08-16 | End: 2019-08-16 | Stop reason: SDUPTHER

## 2019-08-16 RX ORDER — DEXAMETHASONE SODIUM PHOSPHATE 4 MG/ML
INJECTION, SOLUTION INTRA-ARTICULAR; INTRALESIONAL; INTRAMUSCULAR; INTRAVENOUS; SOFT TISSUE PRN
Status: DISCONTINUED | OUTPATIENT
Start: 2019-08-16 | End: 2019-08-16 | Stop reason: SDUPTHER

## 2019-08-16 RX ORDER — HYDRALAZINE HYDROCHLORIDE 20 MG/ML
5 INJECTION INTRAMUSCULAR; INTRAVENOUS EVERY 10 MIN PRN
Status: DISCONTINUED | OUTPATIENT
Start: 2019-08-16 | End: 2019-08-16 | Stop reason: HOSPADM

## 2019-08-16 RX ORDER — PROPOFOL 10 MG/ML
INJECTION, EMULSION INTRAVENOUS PRN
Status: DISCONTINUED | OUTPATIENT
Start: 2019-08-16 | End: 2019-08-16 | Stop reason: SDUPTHER

## 2019-08-16 RX ORDER — SODIUM CHLORIDE, SODIUM LACTATE, POTASSIUM CHLORIDE, CALCIUM CHLORIDE 600; 310; 30; 20 MG/100ML; MG/100ML; MG/100ML; MG/100ML
INJECTION, SOLUTION INTRAVENOUS CONTINUOUS
Status: DISCONTINUED | OUTPATIENT
Start: 2019-08-16 | End: 2019-08-16 | Stop reason: HOSPADM

## 2019-08-16 RX ADMIN — MIDAZOLAM 2 MG: 1 INJECTION INTRAMUSCULAR; INTRAVENOUS at 12:08

## 2019-08-16 RX ADMIN — Medication 2 G: at 12:59

## 2019-08-16 RX ADMIN — MIDAZOLAM 2 MG: 1 INJECTION INTRAMUSCULAR; INTRAVENOUS at 10:54

## 2019-08-16 RX ADMIN — HYDROMORPHONE HYDROCHLORIDE 0.5 MG: 1 INJECTION, SOLUTION INTRAMUSCULAR; INTRAVENOUS; SUBCUTANEOUS at 15:42

## 2019-08-16 RX ADMIN — HYDROMORPHONE HYDROCHLORIDE 0.5 MG: 1 INJECTION, SOLUTION INTRAMUSCULAR; INTRAVENOUS; SUBCUTANEOUS at 15:24

## 2019-08-16 RX ADMIN — PHENYLEPHRINE HYDROCHLORIDE 25 MCG: 10 INJECTION, SOLUTION INTRAMUSCULAR; INTRAVENOUS; SUBCUTANEOUS at 13:27

## 2019-08-16 RX ADMIN — GLYCOPYRROLATE 0.8 MG: 0.2 INJECTION INTRAMUSCULAR; INTRAVENOUS at 14:11

## 2019-08-16 RX ADMIN — ROCURONIUM BROMIDE 100 MG: 10 INJECTION, SOLUTION INTRAVENOUS at 12:49

## 2019-08-16 RX ADMIN — DEXAMETHASONE SODIUM PHOSPHATE 10 MG: 4 INJECTION, SOLUTION INTRAMUSCULAR; INTRAVENOUS at 12:55

## 2019-08-16 RX ADMIN — NEOSTIGMINE METHYLSULFATE 5 MG: 1 INJECTION INTRAMUSCULAR; INTRAVENOUS; SUBCUTANEOUS at 14:11

## 2019-08-16 RX ADMIN — FENTANYL CITRATE 100 MCG: 50 INJECTION, SOLUTION INTRAMUSCULAR; INTRAVENOUS at 12:09

## 2019-08-16 RX ADMIN — ONDANSETRON 4 MG: 2 INJECTION INTRAMUSCULAR; INTRAVENOUS at 12:55

## 2019-08-16 RX ADMIN — ROPIVACAINE HYDROCHLORIDE 30 ML: 5 INJECTION, SOLUTION EPIDURAL; INFILTRATION; PERINEURAL at 12:10

## 2019-08-16 RX ADMIN — SODIUM CHLORIDE, SODIUM LACTATE, POTASSIUM CHLORIDE, AND CALCIUM CHLORIDE: 600; 310; 30; 20 INJECTION, SOLUTION INTRAVENOUS at 12:17

## 2019-08-16 RX ADMIN — ONDANSETRON 4 MG: 2 INJECTION INTRAMUSCULAR; INTRAVENOUS at 14:40

## 2019-08-16 RX ADMIN — METOCLOPRAMIDE 10 MG: 5 INJECTION, SOLUTION INTRAMUSCULAR; INTRAVENOUS at 17:05

## 2019-08-16 RX ADMIN — HYDROMORPHONE HYDROCHLORIDE 0.5 MG: 1 INJECTION, SOLUTION INTRAMUSCULAR; INTRAVENOUS; SUBCUTANEOUS at 17:03

## 2019-08-16 RX ADMIN — PHENYLEPHRINE HYDROCHLORIDE 50 MCG: 10 INJECTION, SOLUTION INTRAMUSCULAR; INTRAVENOUS; SUBCUTANEOUS at 13:32

## 2019-08-16 RX ADMIN — FENTANYL CITRATE 100 MCG: 50 INJECTION INTRAMUSCULAR; INTRAVENOUS at 12:45

## 2019-08-16 RX ADMIN — PROPOFOL 50 MG: 10 INJECTION, EMULSION INTRAVENOUS at 13:46

## 2019-08-16 RX ADMIN — PHENYLEPHRINE HYDROCHLORIDE 100 MCG: 10 INJECTION, SOLUTION INTRAMUSCULAR; INTRAVENOUS; SUBCUTANEOUS at 14:00

## 2019-08-16 RX ADMIN — PHENYLEPHRINE HYDROCHLORIDE 50 MCG: 10 INJECTION, SOLUTION INTRAMUSCULAR; INTRAVENOUS; SUBCUTANEOUS at 13:36

## 2019-08-16 RX ADMIN — PROPOFOL 200 MG: 10 INJECTION, EMULSION INTRAVENOUS at 12:49

## 2019-08-16 RX ADMIN — LIDOCAINE HYDROCHLORIDE 100 MG: 20 INJECTION, SOLUTION INFILTRATION; PERINEURAL at 12:48

## 2019-08-16 RX ADMIN — ESMOLOL HYDROCHLORIDE 30 MG: 10 INJECTION, SOLUTION INTRAVENOUS at 12:49

## 2019-08-16 RX ADMIN — OXYCODONE HYDROCHLORIDE 10 MG: 5 TABLET ORAL at 17:42

## 2019-08-16 ASSESSMENT — PULMONARY FUNCTION TESTS
PIF_VALUE: 20
PIF_VALUE: 0
PIF_VALUE: 21
PIF_VALUE: 20
PIF_VALUE: 21
PIF_VALUE: 21
PIF_VALUE: 0
PIF_VALUE: 21
PIF_VALUE: 20
PIF_VALUE: 2
PIF_VALUE: 2
PIF_VALUE: 20
PIF_VALUE: 21
PIF_VALUE: 2
PIF_VALUE: 20
PIF_VALUE: 19
PIF_VALUE: 1
PIF_VALUE: 20
PIF_VALUE: 21
PIF_VALUE: 21
PIF_VALUE: 20
PIF_VALUE: 21
PIF_VALUE: 20
PIF_VALUE: 21
PIF_VALUE: 1
PIF_VALUE: 21
PIF_VALUE: 1
PIF_VALUE: 20
PIF_VALUE: 16
PIF_VALUE: 22
PIF_VALUE: 21
PIF_VALUE: 20
PIF_VALUE: 21
PIF_VALUE: 20
PIF_VALUE: 21
PIF_VALUE: 20
PIF_VALUE: 20
PIF_VALUE: 21
PIF_VALUE: 20
PIF_VALUE: 20
PIF_VALUE: 2
PIF_VALUE: 21
PIF_VALUE: 20
PIF_VALUE: 0
PIF_VALUE: 21
PIF_VALUE: 20
PIF_VALUE: 1
PIF_VALUE: 16
PIF_VALUE: 20
PIF_VALUE: 21
PIF_VALUE: 2
PIF_VALUE: 16
PIF_VALUE: 21
PIF_VALUE: 20
PIF_VALUE: 20
PIF_VALUE: 19
PIF_VALUE: 21
PIF_VALUE: 20
PIF_VALUE: 21
PIF_VALUE: 20
PIF_VALUE: 20
PIF_VALUE: 21
PIF_VALUE: 0
PIF_VALUE: 0
PIF_VALUE: 19
PIF_VALUE: 20
PIF_VALUE: 20

## 2019-08-16 ASSESSMENT — PAIN SCALES - GENERAL
PAINLEVEL_OUTOF10: 7
PAINLEVEL_OUTOF10: 9
PAINLEVEL_OUTOF10: 8
PAINLEVEL_OUTOF10: 7
PAINLEVEL_OUTOF10: 5
PAINLEVEL_OUTOF10: 0
PAINLEVEL_OUTOF10: 5

## 2019-08-16 ASSESSMENT — PAIN - FUNCTIONAL ASSESSMENT: PAIN_FUNCTIONAL_ASSESSMENT: 0-10

## 2019-08-16 ASSESSMENT — PAIN DESCRIPTION - ORIENTATION: ORIENTATION: LEFT

## 2019-08-16 ASSESSMENT — PAIN DESCRIPTION - PAIN TYPE: TYPE: SURGICAL PAIN;ACUTE PAIN

## 2019-08-16 ASSESSMENT — PAIN DESCRIPTION - LOCATION: LOCATION: ARM

## 2019-08-16 ASSESSMENT — LIFESTYLE VARIABLES: SMOKING_STATUS: 0

## 2019-08-16 ASSESSMENT — PAIN DESCRIPTION - DESCRIPTORS
DESCRIPTORS: TENDER
DESCRIPTORS: TENDER
DESCRIPTORS: TINGLING;SHARP

## 2019-08-16 NOTE — ANESTHESIA POSTPROCEDURE EVALUATION
Department of Anesthesiology  Postprocedure Note    Patient: Angel Villaseñor  MRN: [id number]  YOB: 1965  Date of evaluation: 8/16/2019  Time:  5:32 PM     Procedure Summary     Date:  08/16/19 Room / Location:  [facility] OR 08 / [facility] OR    Anesthesia Start:  7370 Anesthesia Stop:  7069    Procedure:  LEFT SHOULDER DIAGNOSTIC ARTHROSCOPY, PONCHO, OPEN BICEPS TENODESIS, SUBACROMIAL DECOMPRESSION (Left ) Diagnosis:  (LEFT SHOULDER PAIN)    Surgeon:  Jose Santos MD Responsible Provider:  Tha Velez MD    Anesthesia Type:  general, regional ASA Status:  2          Anesthesia Type: general, regional    Justin Phase I: Justin Score: 10    Justin Phase II:      Last vitals: Reviewed and per EMR flowsheets.        Anesthesia Post Evaluation    Patient location during evaluation: PACU  Patient participation: complete - patient participated  Level of consciousness: awake and alert  Pain score: 5  Airway patency: patent  Nausea & Vomiting: no nausea and no vomiting  Complications: no  Cardiovascular status: hemodynamically stable  Respiratory status: acceptable  Hydration status: euvolemic

## 2019-08-16 NOTE — PROGRESS NOTES
PACU Transfer to [facility]    Vitals:    08/16/19 1600   BP: (!) 131/107   Pulse: 97   Resp: 12   Temp: 97.8 °F (36.6 °C)   SpO2: 93%     BP within 20% of baseline value    Intake/Output Summary (Last 24 hours) at 8/16/2019 1628  Last data filed at 8/16/2019 1600  Gross per 24 hour   Intake 1140 ml   Output 200 ml   Net 940 ml       Pain assessment:  present - adequately treated  Pain Level: 5 \"tolerable\"    Patient transferred to care of [facility] RN.    8/16/2019 4:28 PM

## 2019-08-16 NOTE — ANESTHESIA PRE PROCEDURE
Department of Anesthesiology  Preprocedure Note       Name:  Martha Rose   Age:  48 y.o.  :  1965                                          MRN:  [id number]         Date:  2019      Surgeon: Harrison Hutchinson):  Gianluca Calzada MD    Procedure: LEFT SHOULDER ARTHROSCOPIC VS OPEN BICEPS TENODESIS, POSSIBLE BICEPS TENODESIS, WONG, POSSIBLE REVISION SUBSCALPULARIS REPAIR (Left )    Medications prior to admission:   Prior to Admission medications    Medication Sig Start Date End Date Taking?  Authorizing Provider   lisinopril-hydrochlorothiazide (PRINZIDE;ZESTORETIC) 10-12.5 MG per tablet  7/3/19  Yes Historical Provider, MD       Current medications:    Current Facility-Administered Medications   Medication Dose Route Frequency Provider Last Rate Last Dose    ceFAZolin (ANCEF) 2 g in dextrose 5 % 50 mL IVPB  2 g Intravenous Once Samer Chaim Alvarez MD        lactated ringers infusion   Intravenous Continuous Theodore MD Farideh        fentaNYL (SUBLIMAZE) injection 100 mcg  100 mcg Intravenous Once Joellen Blend, DO        ropivacaine (NAROPIN) 0.5% injection 30 mL  30 mL Infiltration Once Joellen Blend, DO           Allergies:  No Known Allergies    Problem List:    Patient Active Problem List   Diagnosis Code    Anxiety F41.9    Cervical radiculopathy M54.12    Closed nondisplaced fracture of seventh cervical vertebra with nonunion D50.679L    Encounter for vasectomy Z30.2    HTN (hypertension) I10    Hx of fusion of cervical spine Z98.1    Pseudarthrosis after fusion or arthrodesis M96.0    Pseudarthrosis following spinal fusion M96.0       Past Medical History:        Diagnosis Date    Hypertension     PONV (postoperative nausea and vomiting)        Past Surgical History:        Procedure Laterality Date    NECK SURGERY      x3    SHOULDER SURGERY Left 2018       Social History:    Social History     Tobacco Use    Smoking status: Never Smoker    Smokeless tobacco:

## 2019-08-16 NOTE — H&P
Alicia Wayne    [id number]    [facility] Same Day Surgery Update H & P  Department of General Surgery   Surgical Service   Pre-operative History and Physical  Last H & P within the last 30 days. DIAGNOSIS:   LEFT SHOULDER PAIN    PROCEDURE:  GABY SAWYER ARTHROSCOP,DIAGNOSTIC [82216] (LEFT SHOULDER ARTHROSCOPIC VS OPEN BICEPS TENODESIS, POSSIBLE BICEPS TENODESIS, WONG, POSSIBLE REVISION SUBSCALPULARIS REPAIR)     HISTORY OF PRESENT ILLNESS:    Patient with chronic left shoulder pain and weakness. The symptoms have been recalcitrant to conservative treatment and the patient presents today for the above procedure. Past Medical History:        Diagnosis Date    Hypertension     PONV (postoperative nausea and vomiting)      Past Surgical History:        Procedure Laterality Date    NECK SURGERY      x3    SHOULDER SURGERY Left 08/25/2018     Past Social History:  Social History     Socioeconomic History    Marital status: Single     Spouse name: None    Number of children: None    Years of education: None    Highest education level: None   Occupational History    None   Social Needs    Financial resource strain: None    Food insecurity:     Worry: None     Inability: None    Transportation needs:     Medical: None     Non-medical: None   Tobacco Use    Smoking status: Never Smoker    Smokeless tobacco: Never Used   Substance and Sexual Activity    Alcohol use:  Yes    Drug use: Never    Sexual activity: None   Lifestyle    Physical activity:     Days per week: None     Minutes per session: None    Stress: None   Relationships    Social connections:     Talks on phone: None     Gets together: None     Attends [employer] service: None     Active member of club or organization: None     Attends meetings of clubs or organizations: None     Relationship status: None    Intimate partner violence:     Fear of current or ex partner: None     Emotionally abused: None     Physically abused:

## 2019-08-17 NOTE — OP NOTE
no hidden subscapularis  tendon tear. The biceps appeared to be loose within the groove and  amenable to tenotomy and then tenodesis. There were no other obvious  anomalous findings. BRIEF HISTORY AND PRESENTING ILLNESS:  The patient is 82-year-old  gentleman who presented with left anterior shoulder pain. He has  history of prior repair. MRI suggested a subscapularis tendon tear and  he had multiple biceps tendon provocative signs. We recommended  surgical intervention in the form of arthroscopic rotator cuff repair  and biceps tenodesis. He understood that concurrent lesions would be  addressed as encountered. He understood the risks such as bleeding,  infection, anesthetic risks, injury to nerves and blood vessels,  stiffness, weakness, incomplete pain or relief, and need for further  surgery. He understood all of these and wished to proceed. He gave  informed consent. He was scheduled on an elective basis after  appropriate preoperative medical clearance. OPERATION PERFORMED:  On the date of the procedure, the patient was  taken to the operating room, placed supine on the operating table. General anesthesia was established. Preoperative antibiotics and  interscalene block were placed in the holding area. Under anesthesia,  exam was carried out with the findings as above. The patient was then  positioned using the [address] [place] [address] St chair positioner in a sitting position. All pressure points were padded. The patient's left shoulder and arm  were prepped and draped in a standard manner for arthroscopic shoulder  surgery. We used a [employer] Spider arm frazier to set the arm in position. We marked the previous arthroscopic portals. We used new ones as  needed. We began diagnostic arthroscopy. The arthroscope was  introduced into the glenohumeral joint through a standard posterior  portal.  Systematic diagnostic arthroscopy was performed with findings  as noted above.   We established an anterior portal over the rotator  interval.  We inserted the arthroscopic shaver across the metal cannula. This was used to debride some labral fraying. We used cautery to open  up the rotator interval down to the base of the coracoid. We inspected  the subscapularis. There was no tearing. We probed the biceps. We did  a limited synovectomy with the cautery. We also cauterized some  synovial fronds around the rotator interval and undersurface of the  rotator cuff. We did limited labral debridement. We then  percutaneously placed an 18-gauge spinal needle through the biceps  tendon and passed #1 PDS suture through that needle. We retrieved the  suture and withdrew the needle. More medial to that, we used an  upbiting basket to tenotomize the biceps tendon right off the  supraglenoid tubercle. We debrided the stump using a shaver. We also  released some adhesions. We then redirected the arthroscope through the  same anterior portal above the rotator cuff and into the subacromial  space. We established a lateral portal under direct visualization and  dilated the portal with arthroscopic shaver and performed our initial  bursectomy. There were quite a bit of bursal adhesions and these were  resected. We placed our arthroscope laterally, completed bursectomy  posterior and posterolaterally using a shaver and a cautery device. We  resected the periosteum from the undersurface of the acromion, gently  teased off the coracoacromial ligament. We also exposed the distal  clavicle. There was no need for any revision acromioplasty. [facility]  joint looked like there was ample spacing and we did not want to do a  revision or Jennifer. We then established accessory anterolateral portal  so we could release the adhesions around the subscapularis and infected  bursal side. Again, it was healthy and intact and there was no tearing. We did a very thorough bursectomy.   We opened up the rotator interval  under direct visualization. We could see the biceps tendon. We  delivered it outside of the groove. We inspected the medial border of  the bicipital groove and could not see any tearing. We probed it very  carefully. This completed our arthroscopic work. We then withdrew our  arthroscopic instruments. We made a 3 cm longitudinal incision over the  principal axillary crease overlying the pectoralis muscle. The incision  was carried out with a #15 blade through the skin and subcutaneous  tissue. Electrocautery was used to establish hemostasis. A Gelpi  retractor was placed. The fascia underneath the pectoralis was  released. We mobilized and brought the arm internally rotated. We  placed our retractors including Hohmann retractor to retract the  conjoined muscles. We delivered the biceps tendon. We removed all the  tenosynovitis. We placed a #2 FiberLoop suture in a locking grasping  fashion starting at the muscle-tendon junction and working our way  proximally. Five passes were made. The tendon proximal to that was  excised. We tied them all, bringing out both ends of the suture exiting  the tendon. We identified our bicipital groove, marked it with cautery,  and then high within the bicipital groove, we drilled our guidewire from  front to back. We sized the biceps to 5, so we reamed to 6. We reamed  through the anterior cortex, removed all bony debridement, removed the  guidewire. We irrigated again. We then loaded a 5.5 BioComposite  tenodesis screw on the cannulated . A loop with #2 FiberWire  suture was placed through the , this allowed us to control the  tendon. The tendon along with the interference screw was delivered into  the bone window and the tendon was secured with the interference screw  until the interference screw was flushed to the anterior cortical  surface. It was stable to tug testing.   We tied a pair of sutures  within the interference screw and a pair of sutures within the tendon. This reinforced our repair in a pants-over-vest type fashion. The tails  were cut short. We irrigated it copiously. We closed the subcutaneous  tissue with 3-0 Vicryl in an interrupted inverted fashion. Routine skin  closure with 4-0 Monocryl and Prineo dressing to close the biceps  wounds. We infiltrated 0.5% Marcaine into the biceps incision. We  closed the arthroscopic portals routinely using 4-0 Monocryl  subcuticular closure and then Prineo dressing. A sterile compressive  dressing was applied. The arm was placed in a padded soft brace. The  patient was repositioned in the supine position before this and promptly  awakened from anesthesia having tolerated the procedure well, and was  taken from the operating room to the recovery room in satisfactory  condition. PLAN:  The patient will be discharged on oral analgesics with  instructions to begin outpatient physical therapy and follow up in the  office in one week.         Jace Bear MD    D: 08/16/2019 14:24:10       T: 08/16/2019 22:56:07     [id number]  Job#: [id number]     Doc#: [id number]    CC:

## 2019-08-19 NOTE — PROGRESS NOTES
CLINICAL PHARMACY NOTE: MEDS TO [address] Select Patient?: No  Total # of Prescriptions Filled: 3   The following medications were delivered to the patient:  · On file  Total # of Interventions Completed: 1  Time Spent (min): 15    Additional Documentation:

## 2019-08-29 ENCOUNTER — OFFICE VISIT (OUTPATIENT)
Dept: ORTHOPEDIC SURGERY | Age: 54
End: 2019-08-29

## 2019-08-29 VITALS
WEIGHT: 205.03 LBS | DIASTOLIC BLOOD PRESSURE: 100 MMHG | HEART RATE: 88 BPM | HEIGHT: 72 IN | BODY MASS INDEX: 27.77 KG/M2 | SYSTOLIC BLOOD PRESSURE: 140 MMHG

## 2019-08-29 DIAGNOSIS — Z98.890 S/P ARTHROSCOPY OF LEFT SHOULDER: Primary | ICD-10-CM

## 2019-08-29 PROCEDURE — 99024 POSTOP FOLLOW-UP VISIT: CPT | Performed by: ORTHOPAEDIC SURGERY

## 2019-09-06 ENCOUNTER — HOSPITAL ENCOUNTER (OUTPATIENT)
Dept: PHYSICAL THERAPY | Age: 54
Setting detail: THERAPIES SERIES
Discharge: HOME | End: 2019-09-06
Payer: COMMERCIAL

## 2019-09-06 PROCEDURE — 97110 THERAPEUTIC EXERCISES: CPT | Performed by: PHYSICAL THERAPIST

## 2019-09-06 PROCEDURE — 97161 PT EVAL LOW COMPLEX 20 MIN: CPT | Performed by: PHYSICAL THERAPIST

## 2019-09-06 PROCEDURE — 97140 MANUAL THERAPY 1/> REGIONS: CPT | Performed by: PHYSICAL THERAPIST

## 2019-09-06 NOTE — FLOWSHEET NOTE
[facility],  [address] [place]  Phone: [phone]  Fax [phone]    Physical Therapy Daily Treatment Note  Date:  2019    Patient Name:  Mathew Griffith    :  1965  MRN: [id number]  Restrictions/Precautions:    Physician Information:  Referring Practitioner: Dr. Tania Lam  Medical/Treatment Diagnosis Information:  · Diagnosis: M75.22 (ICD-10-CM) - Bicipital tendinitis, left shoulder   s/p LEFT SHOULDER   extensive debridement of labrum and rotator interval, arthroscopic lysis of subdeltoid subacromial adhesions, partial synovectomy, open subpectoral biceps tenodesis  DOS:  19  · Treatment Diagnosis:left shoulder pain  M25.512  [] Conservative / [x] Surgical - DOS: 19  Therapy Diagnosis/Practice Pattern:  Practice Pattern I: Bony or Soft Tissue Surgery  Insurance/Certification information:  PT Insurance Information: [facility]  Plan of care signed: [] YES  [] NO  Number of Comorbidities:  []0     [x]1-2    []3+  Date of Patient follow up with Physician:     G-Code (if applicable):      Date G-Code Applied:  19   Quick Dash  93  %    Progress Note: [x]  Yes  []  No  Next due by: Visit #10        Latex Allergy:  [x]NO      []YES  Preferred Language for Healthcare:   [x]English       []other:    Visit # Insurance Allowable Reporting Period   1 ?  Begin Date: 2019               End Date:      RECERT DUE BY:      SUBJECTIVE:  See eval    OBJECTIVE: See eval  Observation:  Palpation:     Test used Initial score Current Score   Pain Summary VAS 5-9    Functional questionnaire QDash 93    ROM flexion      ER      ABD      IR     Strength flexion      ER      ABD      IR        RESTRICTIONS/PRECAUTIONS: cervical fusion, HBP,  2 left shoulder surgeries (biceps tenodesis most recent)    Exercises/Interventions:   Therapeutic Ex Sets/reps Notes        SBS X 10  :05 hep   Shrug  x10  :05 hep   Passive

## 2019-09-06 NOTE — PLAN OF CARE
[]Excellent   [x]Good    []Fair   []Poor    Tolerance of evaluation/treatment:    []Excellent   [x]Good    []Fair   []Poor  Physical Therapy Evaluation Complexity Justification  [x] A history of present problem with:  [x] no personal factors and/or comorbidities that impact the plan of care;  []1-2 personal factors and/or comorbidities that impact the plan of care  []3 personal factors and/or comorbidities that impact the plan of care  [x] An examination of body systems using standardized tests and measures addressing any of the following: body structures and functions (impairments), activity limitations, and/or participation restrictions;:  [] a total of 1-2 or more elements   [x] a total of 3 or more elements   [] a total of 4 or more elements   [x] A clinical presentation with:  [x] stable and/or uncomplicated characteristics   [] evolving clinical presentation with changing characteristics  [] unstable and unpredictable characteristics;   [x] Clinical decision making of [x] low, [] moderate, [] high complexity using standardized patient assessment instrument and/or measurable assessment of functional outcome. [x] EVAL (LOW) 57011 (typically 20 minutes face-to-face)  [] EVAL (MOD) 87255 (typically 30 minutes face-to-face)  [] EVAL (HIGH) 72164 (typically 45 minutes face-to-face)  [] RE-EVAL       PLAN:  Frequency/Duration:  2-3 days per week for 12 Weeks:  INTERVENTIONS:  [x] Therapeutic exercise including: strength training, ROM, for Upper extremity and core   [x]  NMR activation and proprioception for UE, scap and Core   [x] Manual therapy as indicated for shoulder, scapula and spine to include: Dry Needling/IASTM, STM, PROM, Gr I-IV mobilizations, manipulation. [x] Modalities as needed that may include: thermal agents, E-stim, Biofeedback, US, iontophoresis as indicated  [x] Patient education on joint protection, postural re-education, activity modification, progression of HEP.     HEP instruction: (see scanned forms)    GOALS:  Patient stated goal: Able to use my arm. Therapist goals for Patient:   Short Term Goals: To be achieved in: 2 weeks  1. Independent in HEP and progression per patient tolerance, in order to prevent re-injury. 2. Patient will have a decrease in pain to facilitate improvement in movement, function, and ADLs as indicated by Functional Deficits. Long Term Goals: To be achieved in: 12 weeks  1. Disability index score of 35% or less for the [facility] to assist with reaching prior level of function. 2. Patient will demonstrate increased AROM to 150 flex, 90 ER, and 70 IR to allow for proper joint functioning as indicated by patients Functional Deficits. 3. Patient will demonstrate an increase in Strength to 4+/ 5 throughout left UE to allow for proper functional mobility as indicated by patients Functional Deficits. 4. Patient will return to dressing, driving, and maintain home without increased symptoms or restriction. 5. Participate in work conditioning/ work hardening for safe return to work force.         Electronically signed by:  Robinson Zhang PT

## 2019-09-10 ENCOUNTER — HOSPITAL ENCOUNTER (OUTPATIENT)
Dept: PHYSICAL THERAPY | Age: 54
Setting detail: THERAPIES SERIES
Discharge: HOME | End: 2019-09-10
Payer: COMMERCIAL

## 2019-09-10 PROCEDURE — 97140 MANUAL THERAPY 1/> REGIONS: CPT | Performed by: PHYSICAL THERAPIST

## 2019-09-10 PROCEDURE — 97016 VASOPNEUMATIC DEVICE THERAPY: CPT | Performed by: PHYSICAL THERAPIST

## 2019-09-10 PROCEDURE — 97110 THERAPEUTIC EXERCISES: CPT | Performed by: PHYSICAL THERAPIST

## 2019-09-10 NOTE — FLOWSHEET NOTE
recent)    Exercises/Interventions:   Therapeutic Ex Sets/reps Notes        SBS X 10  :05 hep   Shrug  x10  :05 hep   Passive elbow flex X 10 Hep    Passive ER  X 10 Hep    Wrist AROM X 10 hep   pendulums X 10 hep   Standing bent over row 2 x 10    pulleys X 20          Iso ER/   abd  SUBMAX X 10  :05 each Hep                                            Manual Intervention          Joint oscillation/   PROM 15 min                             NMR re-education                                                 Therapeutic Exercise and NMR EXR  [x] (56638) Provided verbal/tactile cueing for activities related to strengthening, flexibility, endurance, ROM  for improvements in scapular, scapulothoracic and UE control with self care, reaching, carrying, lifting, house/yardwork, driving/computer work.    [] (29364) Provided verbal/tactile cueing for activities related to improving balance, coordination, kinesthetic sense, posture, motor skill, proprioception  to assist with  scapular, scapulothoracic and UE control with self care, reaching, carrying, lifting, house/yardwork, driving/computer work. Therapeutic Activities:    [] (80565 or 96891) Provided verbal/tactile cueing for activities related to improving balance, coordination, kinesthetic sense, posture, motor skill, proprioception and motor activation to allow for proper function of scapular, scapulothoracic and UE control with self care, carrying, lifting, driving/computer work.      Home Exercise Program:    [x] (73062) Reviewed/Progressed HEP activities related to strengthening, flexibility, endurance, ROM of scapular, scapulothoracic and UE control with self care, reaching, carrying, lifting, house/yardwork, driving/computer work  [] (45633) Reviewed/Progressed HEP activities related to improving balance, coordination, kinesthetic sense, posture, motor skill, proprioception of scapular, scapulothoracic and UE control with self care, reaching, carrying, lifting, house/yardwork, driving/computer work      Manual Treatments:  PROM / STM / Oscillations-Mobs:  G-I, II, III, IV (PA's, Inf., Post.)  [x] (54983) Provided manual therapy to mobilize soft tissue/joints of cervical/CT, scapular GHJ and UE for the purpose of modulating pain, promoting relaxation,  increasing ROM, reducing/eliminating soft tissue swelling/inflammation/restriction, improving soft tissue extensibility and allowing for proper ROM for normal function with self care, reaching, carrying, lifting, house/yardwork, driving/computer work    Modalities:      [] GR/ESU 15 min    [x] GR 15 min   [] ESU     [] CP    [] MHP    [] declined    Charges:   TIME IN: 12:30         TIME OUT:  1:25  Timed Code Treatment Minutes: 40   Total Treatment Minutes: 55     [] EVAL (LOW) 72198 (typically 20 minutes face-to-face)    [] EVAL (MOD) 57240 (typically 30 minutes face-to-face)  [] EVAL (HIGH) 48679 (typically 45 minutes face-to-face)  [] RE-EVAL     [x] MK(10061) x  1 12:46- 1:10  [] IONTO  [] NMR (67524) x      [x] VASO 1:10-1:25  [x] Manual (75169) x  1  12:30- 12:45  [] Other:  [] TA x       [] Mech Traction (46493)  [] ES(attended) (60205)      [] ES (un) (09679):     GOALS:  Patient stated goal: Able to use my arm. Therapist goals for Patient:   Short Term Goals: To be achieved in: 2 weeks  1. Independent in HEP and progression per patient tolerance, in order to prevent re-injury. 2. Patient will have a decrease in pain to facilitate improvement in movement, function, and ADLs as indicated by Functional Deficits. Long Term Goals: To be achieved in: 12 weeks  1. Disability index score of 35% or less for the [facility] to assist with reaching prior level of function. 2. Patient will demonstrate increased AROM to 150 flex, 90 ER, and 70 IR to allow for proper joint functioning as indicated by patients Functional Deficits.    3. Patient will demonstrate an increase in Strength to 4+/ 5 throughout left UE to allow for proper functional mobility as indicated by patients Functional Deficits. 4. Patient will return to dressing, driving, and maintain home without increased symptoms or restriction. 5. Participate in work conditioning/ work hardening for safe return to work force. New or Updated Goals (if applicable):  [x] No change to goals established upon initial eval/last progress note:  New Goals:    Progression Towards Functional goals:   [] Patient is progressing as expected towards functional goals listed. [] Progression is slowed due to complexities listed. [] Progression has been slowed due to co-morbidities.   [x] Plan just implemented, too soon to assess goals progression  [] Other:     ASSESSMENT:    [] Improvement noted relative to goals:  [] No Improvement noted related to goals:  Summary/Patient's response to treatment: See Eval    Treatment/Activity Tolerance:  [x] Patient tolerated treatment well [] Patient limited by fatique  [] Patient limited by pain  [] Patient limited by other medical complications  [] Other:     Prognosis: [x] Good [] Fair  [] Poor    Patient Requires Follow-up: [x] Yes  [] No    PLAN: See eval  [x] Continue per plan of care [] Alter current plan (see comments)  [x] Plan of care initiated [] Hold pending MD visit [] Discharge    Electronically signed by: Giuseppe Vazquez PT

## 2019-09-13 ENCOUNTER — HOSPITAL ENCOUNTER (OUTPATIENT)
Dept: PHYSICAL THERAPY | Age: 54
Setting detail: THERAPIES SERIES
Discharge: HOME | End: 2019-09-13
Payer: COMMERCIAL

## 2019-09-13 PROCEDURE — 97110 THERAPEUTIC EXERCISES: CPT | Performed by: PHYSICAL THERAPIST

## 2019-09-13 PROCEDURE — 97140 MANUAL THERAPY 1/> REGIONS: CPT | Performed by: PHYSICAL THERAPIST

## 2019-09-13 PROCEDURE — 97016 VASOPNEUMATIC DEVICE THERAPY: CPT | Performed by: PHYSICAL THERAPIST

## 2019-09-13 PROCEDURE — G0283 ELEC STIM OTHER THAN WOUND: HCPCS | Performed by: PHYSICAL THERAPIST

## 2019-09-17 ENCOUNTER — HOSPITAL ENCOUNTER (OUTPATIENT)
Dept: PHYSICAL THERAPY | Age: 54
Setting detail: THERAPIES SERIES
End: 2019-09-17
Payer: COMMERCIAL

## 2019-09-18 ENCOUNTER — HOSPITAL ENCOUNTER (OUTPATIENT)
Dept: PHYSICAL THERAPY | Age: 54
Setting detail: THERAPIES SERIES
Discharge: HOME | End: 2019-09-18
Payer: COMMERCIAL

## 2019-09-18 PROCEDURE — 97016 VASOPNEUMATIC DEVICE THERAPY: CPT | Performed by: PHYSICAL THERAPIST

## 2019-09-18 PROCEDURE — 97140 MANUAL THERAPY 1/> REGIONS: CPT | Performed by: PHYSICAL THERAPIST

## 2019-09-18 PROCEDURE — 97110 THERAPEUTIC EXERCISES: CPT | Performed by: PHYSICAL THERAPIST

## 2019-09-20 ENCOUNTER — HOSPITAL ENCOUNTER (OUTPATIENT)
Dept: PHYSICAL THERAPY | Age: 54
Setting detail: THERAPIES SERIES
Discharge: HOME | End: 2019-09-20
Payer: COMMERCIAL

## 2019-09-20 PROCEDURE — G0283 ELEC STIM OTHER THAN WOUND: HCPCS | Performed by: PHYSICAL THERAPIST

## 2019-09-20 PROCEDURE — 97140 MANUAL THERAPY 1/> REGIONS: CPT | Performed by: PHYSICAL THERAPIST

## 2019-09-20 PROCEDURE — 97110 THERAPEUTIC EXERCISES: CPT | Performed by: PHYSICAL THERAPIST

## 2019-09-20 NOTE — FLOWSHEET NOTE
[facility], [address] [place]  Phone: [phone]  Fax [phone]    Physical Therapy Daily Treatment Note  Date:  2019    Patient Name:  Vera Garcia    :  1965  MRN: [id number]  Restrictions/Precautions:    Physician Information:  Referring Practitioner: Dr. Dariusz Romero  Medical/Treatment Diagnosis Information:  · Diagnosis: M75.22 (ICD-10-CM) - Bicipital tendinitis, left shoulder   s/p LEFT SHOULDER   extensive debridement of labrum and rotator interval, arthroscopic lysis of subdeltoid subacromial adhesions, partial synovectomy, open subpectoral biceps tenodesis  DOS:  19  · Treatment Diagnosis:left shoulder pain  M25.512  [] Conservative / [x] Surgical - DOS: 19  Therapy Diagnosis/Practice Pattern:  Practice Pattern I: Bony or Soft Tissue Surgery  Insurance/Certification information:  PT Insurance Information: [facility]  Plan of care signed: [] YES  [] NO  Number of Comorbidities:  []0     [x]1-2    []3+  Date of Patient follow up with Physician: 19    G-Code (if applicable):      Date G-Code Applied:  19   Quick Dash  93  %    Progress Note: [x]  Yes  []  No  Next due by: Visit #10        Latex Allergy:  [x]NO      []YES  Preferred Language for Healthcare:   [x]English       []other:    Visit # Insurance Allowable Reporting Period   5 BWC 2-3 x/ 4-6 weeks (18 visits)  Begin Date: 2019               End Date:      RECERT DUE BY:      SUBJECTIVE:  Pt is 5 weeks post op. Pt states that he is icing for 30 min every two hours. Pt is taking ibuprofen.        OBJECTIVE:   Observation:  Palpation:     Test used Initial score Current Score   Pain Summary VAS 5-9 4-8   Functional questionnaire QDash 93    ROM flexion 90 140    ER 34 65    ABD 90 141    IR 60 70   Strength flexion      ER      ABD      IR        RESTRICTIONS/PRECAUTIONS: cervical fusion, HBP,  2 left shoulder increase in Strength to 4+/ 5 throughout left UE to allow for proper functional mobility as indicated by patients Functional Deficits. 4. Patient will return to dressing, driving, and maintain home without increased symptoms or restriction. 5. Participate in work conditioning/ work hardening for safe return to work force. New or Updated Goals (if applicable):  [x] No change to goals established upon initial eval/last progress note:  New Goals:    Progression Towards Functional goals:   [] Patient is progressing as expected towards functional goals listed. [x] Progression is slowed due to complexities listed. [] Progression has been slowed due to co-morbidities. [] Plan just implemented, too soon to assess goals progression  [] Other:     ASSESSMENT:    [] Improvement noted relative to goals:  [] No Improvement noted related to goals:  Summary/Patient's response to treatment: Demonstrated progression with PROM this date. Reports n/t present with arm in dependent position.      Treatment/Activity Tolerance:  [x] Patient tolerated treatment well [] Patient limited by fatique  [] Patient limited by pain  [] Patient limited by other medical complications  [] Other:     Prognosis: [x] Good [] Fair  [] Poor    Patient Requires Follow-up: [x] Yes  [] No    PLAN: See eval  [x] Continue per plan of care [] Alter current plan (see comments)  [] Plan of care initiated [] Hold pending MD visit [] Discharge    Electronically signed by: Rosa Gonzalez PT

## 2019-09-23 ENCOUNTER — HOSPITAL ENCOUNTER (OUTPATIENT)
Dept: PHYSICAL THERAPY | Age: 54
Setting detail: THERAPIES SERIES
Discharge: HOME | End: 2019-09-23
Payer: COMMERCIAL

## 2019-09-23 PROCEDURE — G0283 ELEC STIM OTHER THAN WOUND: HCPCS | Performed by: PHYSICAL THERAPIST

## 2019-09-23 PROCEDURE — 97140 MANUAL THERAPY 1/> REGIONS: CPT | Performed by: PHYSICAL THERAPIST

## 2019-09-23 PROCEDURE — 97110 THERAPEUTIC EXERCISES: CPT | Performed by: PHYSICAL THERAPIST

## 2019-09-23 NOTE — FLOWSHEET NOTE
[facility],  [address] [place]  Phone: [phone]  Fax [phone]    Physical Therapy Daily Treatment Note  Date:  2019    Patient Name:  Amira Berg    :  1965  MRN: [id number]  Restrictions/Precautions:    Physician Information:  Referring Practitioner: Dr. Karen Ruiz  Medical/Treatment Diagnosis Information:  · Diagnosis: M75.22 (ICD-10-CM) - Bicipital tendinitis, left shoulder   s/p LEFT SHOULDER   extensive debridement of labrum and rotator interval, arthroscopic lysis of subdeltoid subacromial adhesions, partial synovectomy, open subpectoral biceps tenodesis  DOS:  19  · Treatment Diagnosis:left shoulder pain  M25.512  [] Conservative / [x] Surgical - DOS: 19  Therapy Diagnosis/Practice Pattern:  Practice Pattern I: Bony or Soft Tissue Surgery  Insurance/Certification information:  PT Insurance Information: [facility]  Plan of care signed: [] YES  [] NO  Number of Comorbidities:  []0     [x]1-2    []3+  Date of Patient follow up with Physician: 19    G-Code (if applicable):      Date G-Code Applied:  19   Quick Dash  93  %    Progress Note: [x]  Yes  []  No  Next due by: Visit #10        Latex Allergy:  [x]NO      []YES  Preferred Language for Healthcare:   [x]English       []other:    Visit # Insurance Allowable Reporting Period   6 BWC 2-3 x/ 4-6 weeks (18 visits)  Begin Date: 2019               End Date:      RECERT DUE BY:      SUBJECTIVE:  Patient reports pain persists to significant degree. Continues with ibuprofen and ice which only helps to small degree. Attempting to not use arm much at home. Reports pain anterior shoulder and down into bicep. Decreased arm swing. Difficulty sleeping.      OBJECTIVE:   Observation:  Palpation:     Test used Initial score Current Score   Pain Summary VAS 5-9 4-8   Functional questionnaire QDash 93    ROM flexion 90 140    ER 34 65 ER, and 70 IR to allow for proper joint functioning as indicated by patients Functional Deficits. 3. Patient will demonstrate an increase in Strength to 4+/ 5 throughout left UE to allow for proper functional mobility as indicated by patients Functional Deficits. 4. Patient will return to dressing, driving, and maintain home without increased symptoms or restriction. 5. Participate in work conditioning/ work hardening for safe return to work force. New or Updated Goals (if applicable):  [x] No change to goals established upon initial eval/last progress note:  New Goals:    Progression Towards Functional goals:   [] Patient is progressing as expected towards functional goals listed. [x] Progression is slowed due to complexities listed. [] Progression has been slowed due to co-morbidities. [] Plan just implemented, too soon to assess goals progression  [] Other:     ASSESSMENT:    [] Improvement noted relative to goals:  [] No Improvement noted related to goals:  Summary/Patient's response to treatment: See MD note.      Treatment/Activity Tolerance:  [x] Patient tolerated treatment well [] Patient limited by fatique  [] Patient limited by pain  [] Patient limited by other medical complications  [] Other:     Prognosis: [x] Good [] Fair  [] Poor    Patient Requires Follow-up: [x] Yes  [] No    PLAN: See eval  [x] Continue per plan of care [] Alter current plan (see comments)  [] Plan of care initiated [] Hold pending MD visit [] Discharge    Electronically signed by: Kwasi Gan PT

## 2019-09-24 ENCOUNTER — OFFICE VISIT (OUTPATIENT)
Dept: ORTHOPEDIC SURGERY | Age: 54
End: 2019-09-24

## 2019-09-24 VITALS
DIASTOLIC BLOOD PRESSURE: 111 MMHG | SYSTOLIC BLOOD PRESSURE: 170 MMHG | WEIGHT: 205.03 LBS | HEART RATE: 84 BPM | HEIGHT: 72 IN | BODY MASS INDEX: 27.77 KG/M2

## 2019-09-24 DIAGNOSIS — M75.22 BICEPS TENDONITIS ON LEFT: ICD-10-CM

## 2019-09-24 DIAGNOSIS — M25.512 LEFT SHOULDER PAIN, UNSPECIFIED CHRONICITY: ICD-10-CM

## 2019-09-24 DIAGNOSIS — Z98.890 S/P ARTHROSCOPY OF LEFT SHOULDER: Primary | ICD-10-CM

## 2019-09-24 PROCEDURE — 99024 POSTOP FOLLOW-UP VISIT: CPT | Performed by: ORTHOPAEDIC SURGERY

## 2019-09-24 RX ORDER — MELOXICAM 15 MG/1
15 TABLET ORAL DAILY
Qty: 30 TABLET | Refills: 3 | Status: SHIPPED | OUTPATIENT
Start: 2019-09-24

## 2019-09-27 ENCOUNTER — HOSPITAL ENCOUNTER (OUTPATIENT)
Dept: PHYSICAL THERAPY | Age: 54
Setting detail: THERAPIES SERIES
Discharge: HOME | End: 2019-09-27
Payer: COMMERCIAL

## 2019-09-27 ENCOUNTER — APPOINTMENT (OUTPATIENT)
Dept: PHYSICAL THERAPY | Age: 54
End: 2019-09-27
Payer: COMMERCIAL

## 2019-09-27 PROCEDURE — G0283 ELEC STIM OTHER THAN WOUND: HCPCS | Performed by: PHYSICAL THERAPIST

## 2019-09-27 PROCEDURE — 97110 THERAPEUTIC EXERCISES: CPT | Performed by: PHYSICAL THERAPIST

## 2019-09-27 PROCEDURE — 97140 MANUAL THERAPY 1/> REGIONS: CPT | Performed by: PHYSICAL THERAPIST

## 2019-09-30 ENCOUNTER — HOSPITAL ENCOUNTER (OUTPATIENT)
Dept: PHYSICAL THERAPY | Age: 54
Setting detail: THERAPIES SERIES
Discharge: HOME | End: 2019-09-30
Payer: COMMERCIAL

## 2019-09-30 PROCEDURE — 97110 THERAPEUTIC EXERCISES: CPT | Performed by: PHYSICAL THERAPIST

## 2019-09-30 PROCEDURE — 97140 MANUAL THERAPY 1/> REGIONS: CPT | Performed by: PHYSICAL THERAPIST

## 2019-09-30 PROCEDURE — 97016 VASOPNEUMATIC DEVICE THERAPY: CPT | Performed by: PHYSICAL THERAPIST

## 2019-10-04 ENCOUNTER — HOSPITAL ENCOUNTER (OUTPATIENT)
Dept: PHYSICAL THERAPY | Age: 54
Setting detail: THERAPIES SERIES
Discharge: HOME | End: 2019-10-04
Payer: COMMERCIAL

## 2019-10-04 PROCEDURE — 97035 APP MDLTY 1+ULTRASOUND EA 15: CPT | Performed by: PHYSICAL THERAPIST

## 2019-10-04 PROCEDURE — 97016 VASOPNEUMATIC DEVICE THERAPY: CPT | Performed by: PHYSICAL THERAPIST

## 2019-10-04 PROCEDURE — 97110 THERAPEUTIC EXERCISES: CPT | Performed by: PHYSICAL THERAPIST

## 2019-10-08 ENCOUNTER — HOSPITAL ENCOUNTER (OUTPATIENT)
Dept: PHYSICAL THERAPY | Age: 54
Setting detail: THERAPIES SERIES
Discharge: HOME | End: 2019-10-08
Payer: COMMERCIAL

## 2019-10-10 ENCOUNTER — OFFICE VISIT (OUTPATIENT)
Dept: ORTHOPEDIC SURGERY | Age: 54
End: 2019-10-10

## 2019-10-10 VITALS
SYSTOLIC BLOOD PRESSURE: 178 MMHG | DIASTOLIC BLOOD PRESSURE: 106 MMHG | BODY MASS INDEX: 27.77 KG/M2 | WEIGHT: 205.03 LBS | HEART RATE: 86 BPM | HEIGHT: 72 IN

## 2019-10-10 DIAGNOSIS — Z98.890 S/P ARTHROSCOPY OF LEFT SHOULDER: Primary | ICD-10-CM

## 2019-10-10 DIAGNOSIS — M25.512 LEFT SHOULDER PAIN, UNSPECIFIED CHRONICITY: ICD-10-CM

## 2019-10-10 PROCEDURE — 99024 POSTOP FOLLOW-UP VISIT: CPT | Performed by: ORTHOPAEDIC SURGERY

## 2019-10-11 ENCOUNTER — APPOINTMENT (OUTPATIENT)
Dept: PHYSICAL THERAPY | Age: 54
End: 2019-10-11
Payer: COMMERCIAL

## 2019-10-15 ENCOUNTER — TREATMENT (OUTPATIENT)
Dept: PHYSICAL THERAPY | Age: 54
End: 2019-10-15
Payer: COMMERCIAL

## 2019-10-15 ENCOUNTER — APPOINTMENT (OUTPATIENT)
Dept: PHYSICAL THERAPY | Age: 54
End: 2019-10-15
Payer: COMMERCIAL

## 2019-10-15 DIAGNOSIS — M25.512 ACUTE POSTOPERATIVE PAIN OF LEFT SHOULDER: Primary | ICD-10-CM

## 2019-10-15 DIAGNOSIS — M75.22 TENDONITIS OF UPPER BICEPS TENDON OF LEFT SHOULDER: ICD-10-CM

## 2019-10-15 DIAGNOSIS — G89.18 ACUTE POSTOPERATIVE PAIN OF LEFT SHOULDER: Primary | ICD-10-CM

## 2019-10-15 DIAGNOSIS — M75.82 ROTATOR CUFF TENDONITIS, LEFT: ICD-10-CM

## 2019-10-15 PROCEDURE — 97530 THERAPEUTIC ACTIVITIES: CPT | Performed by: PHYSICAL THERAPIST

## 2019-10-15 PROCEDURE — 97016 VASOPNEUMATIC DEVICE THERAPY: CPT | Performed by: PHYSICAL THERAPIST

## 2019-10-15 PROCEDURE — 97110 THERAPEUTIC EXERCISES: CPT | Performed by: PHYSICAL THERAPIST

## 2019-10-15 PROCEDURE — 97140 MANUAL THERAPY 1/> REGIONS: CPT | Performed by: PHYSICAL THERAPIST

## 2019-10-17 ENCOUNTER — TREATMENT (OUTPATIENT)
Dept: PHYSICAL THERAPY | Age: 54
End: 2019-10-17
Payer: COMMERCIAL

## 2019-10-17 DIAGNOSIS — M75.22 TENDONITIS OF UPPER BICEPS TENDON OF LEFT SHOULDER: ICD-10-CM

## 2019-10-17 DIAGNOSIS — M75.82 ROTATOR CUFF TENDONITIS, LEFT: ICD-10-CM

## 2019-10-17 DIAGNOSIS — G89.18 ACUTE POSTOPERATIVE PAIN OF LEFT SHOULDER: Primary | ICD-10-CM

## 2019-10-17 DIAGNOSIS — M25.512 ACUTE POSTOPERATIVE PAIN OF LEFT SHOULDER: Primary | ICD-10-CM

## 2019-10-17 PROCEDURE — 97112 NEUROMUSCULAR REEDUCATION: CPT | Performed by: PHYSICAL THERAPIST

## 2019-10-17 PROCEDURE — 97110 THERAPEUTIC EXERCISES: CPT | Performed by: PHYSICAL THERAPIST

## 2019-10-17 PROCEDURE — 97140 MANUAL THERAPY 1/> REGIONS: CPT | Performed by: PHYSICAL THERAPIST

## 2019-10-17 PROCEDURE — 97016 VASOPNEUMATIC DEVICE THERAPY: CPT | Performed by: PHYSICAL THERAPIST

## 2019-10-18 ENCOUNTER — APPOINTMENT (OUTPATIENT)
Dept: PHYSICAL THERAPY | Age: 54
End: 2019-10-18
Payer: COMMERCIAL

## 2019-10-21 ENCOUNTER — APPOINTMENT (OUTPATIENT)
Dept: PHYSICAL THERAPY | Age: 54
End: 2019-10-21
Payer: COMMERCIAL

## 2019-10-22 ENCOUNTER — TREATMENT (OUTPATIENT)
Dept: PHYSICAL THERAPY | Age: 54
End: 2019-10-22

## 2019-10-22 DIAGNOSIS — M75.22 TENDONITIS OF UPPER BICEPS TENDON OF LEFT SHOULDER: ICD-10-CM

## 2019-10-22 DIAGNOSIS — G89.18 ACUTE POSTOPERATIVE PAIN OF LEFT SHOULDER: Primary | ICD-10-CM

## 2019-10-22 DIAGNOSIS — M75.82 ROTATOR CUFF TENDONITIS, LEFT: ICD-10-CM

## 2019-10-22 DIAGNOSIS — M25.512 ACUTE POSTOPERATIVE PAIN OF LEFT SHOULDER: Primary | ICD-10-CM

## 2019-10-24 ENCOUNTER — TREATMENT (OUTPATIENT)
Dept: PHYSICAL THERAPY | Age: 54
End: 2019-10-24
Payer: COMMERCIAL

## 2019-10-24 ENCOUNTER — APPOINTMENT (OUTPATIENT)
Dept: PHYSICAL THERAPY | Age: 54
End: 2019-10-24
Payer: COMMERCIAL

## 2019-10-24 DIAGNOSIS — G89.18 ACUTE POSTOPERATIVE PAIN OF LEFT SHOULDER: Primary | ICD-10-CM

## 2019-10-24 DIAGNOSIS — M25.512 ACUTE POSTOPERATIVE PAIN OF LEFT SHOULDER: Primary | ICD-10-CM

## 2019-10-24 DIAGNOSIS — M75.22 TENDONITIS OF UPPER BICEPS TENDON OF LEFT SHOULDER: ICD-10-CM

## 2019-10-24 DIAGNOSIS — M75.82 ROTATOR CUFF TENDONITIS, LEFT: ICD-10-CM

## 2019-10-24 PROCEDURE — 97140 MANUAL THERAPY 1/> REGIONS: CPT | Performed by: PHYSICAL THERAPIST

## 2019-10-24 PROCEDURE — 97016 VASOPNEUMATIC DEVICE THERAPY: CPT | Performed by: PHYSICAL THERAPIST

## 2019-10-24 PROCEDURE — 97112 NEUROMUSCULAR REEDUCATION: CPT | Performed by: PHYSICAL THERAPIST

## 2019-10-24 PROCEDURE — 97110 THERAPEUTIC EXERCISES: CPT | Performed by: PHYSICAL THERAPIST

## 2019-10-29 ENCOUNTER — TREATMENT (OUTPATIENT)
Dept: PHYSICAL THERAPY | Age: 54
End: 2019-10-29
Payer: COMMERCIAL

## 2019-10-29 ENCOUNTER — APPOINTMENT (OUTPATIENT)
Dept: PHYSICAL THERAPY | Age: 54
End: 2019-10-29
Payer: COMMERCIAL

## 2019-10-29 DIAGNOSIS — M75.22 TENDONITIS OF UPPER BICEPS TENDON OF LEFT SHOULDER: ICD-10-CM

## 2019-10-29 DIAGNOSIS — M75.82 ROTATOR CUFF TENDONITIS, LEFT: ICD-10-CM

## 2019-10-29 DIAGNOSIS — G89.18 ACUTE POSTOPERATIVE PAIN OF LEFT SHOULDER: Primary | ICD-10-CM

## 2019-10-29 DIAGNOSIS — M25.512 ACUTE POSTOPERATIVE PAIN OF LEFT SHOULDER: Primary | ICD-10-CM

## 2019-10-29 PROCEDURE — 97112 NEUROMUSCULAR REEDUCATION: CPT | Performed by: PHYSICAL THERAPIST

## 2019-10-29 PROCEDURE — 97110 THERAPEUTIC EXERCISES: CPT | Performed by: PHYSICAL THERAPIST

## 2019-10-29 PROCEDURE — 97140 MANUAL THERAPY 1/> REGIONS: CPT | Performed by: PHYSICAL THERAPIST

## 2019-10-29 PROCEDURE — 97016 VASOPNEUMATIC DEVICE THERAPY: CPT | Performed by: PHYSICAL THERAPIST

## 2019-10-31 ENCOUNTER — APPOINTMENT (OUTPATIENT)
Dept: PHYSICAL THERAPY | Age: 54
End: 2019-10-31
Payer: COMMERCIAL

## 2019-11-01 ENCOUNTER — TREATMENT (OUTPATIENT)
Dept: PHYSICAL THERAPY | Age: 54
End: 2019-11-01
Payer: COMMERCIAL

## 2019-11-01 DIAGNOSIS — M25.512 ACUTE POSTOPERATIVE PAIN OF LEFT SHOULDER: Primary | ICD-10-CM

## 2019-11-01 DIAGNOSIS — M75.82 ROTATOR CUFF TENDONITIS, LEFT: ICD-10-CM

## 2019-11-01 DIAGNOSIS — G89.18 ACUTE POSTOPERATIVE PAIN OF LEFT SHOULDER: Primary | ICD-10-CM

## 2019-11-01 DIAGNOSIS — M75.22 TENDONITIS OF UPPER BICEPS TENDON OF LEFT SHOULDER: ICD-10-CM

## 2019-11-01 PROCEDURE — 97140 MANUAL THERAPY 1/> REGIONS: CPT | Performed by: PHYSICAL THERAPIST

## 2019-11-01 PROCEDURE — 97112 NEUROMUSCULAR REEDUCATION: CPT | Performed by: PHYSICAL THERAPIST

## 2019-11-01 PROCEDURE — 97110 THERAPEUTIC EXERCISES: CPT | Performed by: PHYSICAL THERAPIST

## 2019-11-05 ENCOUNTER — TREATMENT (OUTPATIENT)
Dept: PHYSICAL THERAPY | Age: 54
End: 2019-11-05
Payer: COMMERCIAL

## 2019-11-05 DIAGNOSIS — M75.22 TENDONITIS OF UPPER BICEPS TENDON OF LEFT SHOULDER: ICD-10-CM

## 2019-11-05 DIAGNOSIS — G89.18 ACUTE POSTOPERATIVE PAIN OF LEFT SHOULDER: Primary | ICD-10-CM

## 2019-11-05 DIAGNOSIS — M25.512 ACUTE POSTOPERATIVE PAIN OF LEFT SHOULDER: Primary | ICD-10-CM

## 2019-11-05 PROCEDURE — 97016 VASOPNEUMATIC DEVICE THERAPY: CPT | Performed by: PHYSICAL THERAPIST

## 2019-11-05 PROCEDURE — 97110 THERAPEUTIC EXERCISES: CPT | Performed by: PHYSICAL THERAPIST

## 2019-11-05 PROCEDURE — 97140 MANUAL THERAPY 1/> REGIONS: CPT | Performed by: PHYSICAL THERAPIST

## 2019-11-05 PROCEDURE — 97112 NEUROMUSCULAR REEDUCATION: CPT | Performed by: PHYSICAL THERAPIST

## 2019-11-06 ENCOUNTER — TELEPHONE (OUTPATIENT)
Dept: ORTHOPEDIC SURGERY | Age: 54
End: 2019-11-06

## 2019-11-07 ENCOUNTER — TREATMENT (OUTPATIENT)
Dept: PHYSICAL THERAPY | Age: 54
End: 2019-11-07
Payer: COMMERCIAL

## 2019-11-07 DIAGNOSIS — G89.18 ACUTE POSTOPERATIVE PAIN OF LEFT SHOULDER: Primary | ICD-10-CM

## 2019-11-07 DIAGNOSIS — M75.22 TENDONITIS OF UPPER BICEPS TENDON OF LEFT SHOULDER: ICD-10-CM

## 2019-11-07 DIAGNOSIS — M25.512 ACUTE POSTOPERATIVE PAIN OF LEFT SHOULDER: Primary | ICD-10-CM

## 2019-11-07 PROCEDURE — 97140 MANUAL THERAPY 1/> REGIONS: CPT | Performed by: PHYSICAL THERAPIST

## 2019-11-07 PROCEDURE — 97110 THERAPEUTIC EXERCISES: CPT | Performed by: PHYSICAL THERAPIST

## 2019-11-07 PROCEDURE — 97112 NEUROMUSCULAR REEDUCATION: CPT | Performed by: PHYSICAL THERAPIST

## 2019-11-12 ENCOUNTER — TREATMENT (OUTPATIENT)
Dept: PHYSICAL THERAPY | Age: 54
End: 2019-11-12
Payer: COMMERCIAL

## 2019-11-12 DIAGNOSIS — M25.512 ACUTE POSTOPERATIVE PAIN OF LEFT SHOULDER: Primary | ICD-10-CM

## 2019-11-12 DIAGNOSIS — M75.22 TENDONITIS OF UPPER BICEPS TENDON OF LEFT SHOULDER: ICD-10-CM

## 2019-11-12 DIAGNOSIS — G89.18 ACUTE POSTOPERATIVE PAIN OF LEFT SHOULDER: Primary | ICD-10-CM

## 2019-11-12 PROCEDURE — 97140 MANUAL THERAPY 1/> REGIONS: CPT | Performed by: PHYSICAL THERAPIST

## 2019-11-12 PROCEDURE — 97110 THERAPEUTIC EXERCISES: CPT | Performed by: PHYSICAL THERAPIST

## 2019-11-12 PROCEDURE — 97112 NEUROMUSCULAR REEDUCATION: CPT | Performed by: PHYSICAL THERAPIST

## 2019-11-14 ENCOUNTER — TREATMENT (OUTPATIENT)
Dept: PHYSICAL THERAPY | Age: 54
End: 2019-11-14
Payer: COMMERCIAL

## 2019-11-14 ENCOUNTER — OFFICE VISIT (OUTPATIENT)
Dept: ORTHOPEDIC SURGERY | Age: 54
End: 2019-11-14

## 2019-11-14 VITALS
DIASTOLIC BLOOD PRESSURE: 112 MMHG | SYSTOLIC BLOOD PRESSURE: 150 MMHG | HEIGHT: 72 IN | BODY MASS INDEX: 27.77 KG/M2 | WEIGHT: 205.03 LBS | HEART RATE: 96 BPM

## 2019-11-14 DIAGNOSIS — M75.22 TENDONITIS OF UPPER BICEPS TENDON OF LEFT SHOULDER: ICD-10-CM

## 2019-11-14 DIAGNOSIS — M75.82 ROTATOR CUFF TENDONITIS, LEFT: ICD-10-CM

## 2019-11-14 DIAGNOSIS — Z98.890 S/P ARTHROSCOPY OF LEFT SHOULDER: Primary | ICD-10-CM

## 2019-11-14 DIAGNOSIS — M25.512 LEFT SHOULDER PAIN, UNSPECIFIED CHRONICITY: ICD-10-CM

## 2019-11-14 DIAGNOSIS — G89.18 ACUTE POSTOPERATIVE PAIN OF LEFT SHOULDER: Primary | ICD-10-CM

## 2019-11-14 DIAGNOSIS — M25.512 ACUTE POSTOPERATIVE PAIN OF LEFT SHOULDER: Primary | ICD-10-CM

## 2019-11-14 DIAGNOSIS — M75.22 BICEPS TENDONITIS ON LEFT: ICD-10-CM

## 2019-11-14 PROCEDURE — 97112 NEUROMUSCULAR REEDUCATION: CPT | Performed by: PHYSICAL THERAPIST

## 2019-11-14 PROCEDURE — 97140 MANUAL THERAPY 1/> REGIONS: CPT | Performed by: PHYSICAL THERAPIST

## 2019-11-14 PROCEDURE — 97110 THERAPEUTIC EXERCISES: CPT | Performed by: PHYSICAL THERAPIST

## 2019-11-14 PROCEDURE — 99024 POSTOP FOLLOW-UP VISIT: CPT | Performed by: ORTHOPAEDIC SURGERY

## 2019-11-18 ENCOUNTER — TREATMENT (OUTPATIENT)
Dept: PHYSICAL THERAPY | Age: 54
End: 2019-11-18
Payer: COMMERCIAL

## 2019-11-18 DIAGNOSIS — M75.82 ROTATOR CUFF TENDONITIS, LEFT: ICD-10-CM

## 2019-11-18 DIAGNOSIS — M25.512 ACUTE POSTOPERATIVE PAIN OF LEFT SHOULDER: Primary | ICD-10-CM

## 2019-11-18 DIAGNOSIS — G89.18 ACUTE POSTOPERATIVE PAIN OF LEFT SHOULDER: Primary | ICD-10-CM

## 2019-11-18 DIAGNOSIS — M75.22 TENDONITIS OF UPPER BICEPS TENDON OF LEFT SHOULDER: ICD-10-CM

## 2019-11-18 PROCEDURE — 97110 THERAPEUTIC EXERCISES: CPT | Performed by: PHYSICAL THERAPIST

## 2019-11-18 PROCEDURE — 97112 NEUROMUSCULAR REEDUCATION: CPT | Performed by: PHYSICAL THERAPIST

## 2019-11-18 PROCEDURE — 97140 MANUAL THERAPY 1/> REGIONS: CPT | Performed by: PHYSICAL THERAPIST

## 2019-11-26 ENCOUNTER — OFFICE VISIT (OUTPATIENT)
Dept: ORTHOPEDIC SURGERY | Age: 54
End: 2019-11-26
Payer: COMMERCIAL

## 2019-11-26 ENCOUNTER — TREATMENT (OUTPATIENT)
Dept: PHYSICAL THERAPY | Age: 54
End: 2019-11-26
Payer: COMMERCIAL

## 2019-11-26 VITALS
HEART RATE: 99 BPM | DIASTOLIC BLOOD PRESSURE: 114 MMHG | HEIGHT: 72 IN | BODY MASS INDEX: 27.77 KG/M2 | SYSTOLIC BLOOD PRESSURE: 166 MMHG | WEIGHT: 205.03 LBS

## 2019-11-26 DIAGNOSIS — G89.18 ACUTE POSTOPERATIVE PAIN OF LEFT SHOULDER: Primary | ICD-10-CM

## 2019-11-26 DIAGNOSIS — M75.22 BICEPS TENDONITIS ON LEFT: ICD-10-CM

## 2019-11-26 DIAGNOSIS — M25.512 LEFT SHOULDER PAIN, UNSPECIFIED CHRONICITY: ICD-10-CM

## 2019-11-26 DIAGNOSIS — M25.512 ACUTE POSTOPERATIVE PAIN OF LEFT SHOULDER: Primary | ICD-10-CM

## 2019-11-26 DIAGNOSIS — M75.22 TENDONITIS OF UPPER BICEPS TENDON OF LEFT SHOULDER: ICD-10-CM

## 2019-11-26 DIAGNOSIS — Z98.890 S/P ARTHROSCOPY OF LEFT SHOULDER: Primary | ICD-10-CM

## 2019-11-26 PROCEDURE — 97110 THERAPEUTIC EXERCISES: CPT | Performed by: PHYSICAL THERAPIST

## 2019-11-26 PROCEDURE — 99213 OFFICE O/P EST LOW 20 MIN: CPT | Performed by: ORTHOPAEDIC SURGERY

## 2019-11-26 PROCEDURE — 97140 MANUAL THERAPY 1/> REGIONS: CPT | Performed by: PHYSICAL THERAPIST

## 2019-11-26 PROCEDURE — 97112 NEUROMUSCULAR REEDUCATION: CPT | Performed by: PHYSICAL THERAPIST

## 2019-11-26 RX ORDER — PREDNISONE 10 MG/1
TABLET ORAL
Qty: 35 TABLET | Refills: 0 | Status: SHIPPED | OUTPATIENT
Start: 2019-11-26 | End: 2019-12-17

## 2019-11-26 RX ORDER — LISINOPRIL 20 MG/1
TABLET ORAL
COMMUNITY
Start: 2019-11-14

## 2019-12-03 ENCOUNTER — TREATMENT (OUTPATIENT)
Dept: PHYSICAL THERAPY | Age: 54
End: 2019-12-03

## 2019-12-03 DIAGNOSIS — G89.18 ACUTE POSTOPERATIVE PAIN OF LEFT SHOULDER: Primary | ICD-10-CM

## 2019-12-03 DIAGNOSIS — M25.512 ACUTE POSTOPERATIVE PAIN OF LEFT SHOULDER: Primary | ICD-10-CM

## 2019-12-03 DIAGNOSIS — M75.22 TENDONITIS OF UPPER BICEPS TENDON OF LEFT SHOULDER: ICD-10-CM

## 2019-12-03 DIAGNOSIS — M75.82 ROTATOR CUFF TENDONITIS, LEFT: ICD-10-CM

## 2019-12-05 ENCOUNTER — TREATMENT (OUTPATIENT)
Dept: PHYSICAL THERAPY | Age: 54
End: 2019-12-05
Payer: COMMERCIAL

## 2019-12-05 DIAGNOSIS — M75.82 ROTATOR CUFF TENDONITIS, LEFT: ICD-10-CM

## 2019-12-05 DIAGNOSIS — M25.512 ACUTE POSTOPERATIVE PAIN OF LEFT SHOULDER: Primary | ICD-10-CM

## 2019-12-05 DIAGNOSIS — G89.18 ACUTE POSTOPERATIVE PAIN OF LEFT SHOULDER: Primary | ICD-10-CM

## 2019-12-05 DIAGNOSIS — M75.22 TENDONITIS OF UPPER BICEPS TENDON OF LEFT SHOULDER: ICD-10-CM

## 2019-12-05 PROCEDURE — 97140 MANUAL THERAPY 1/> REGIONS: CPT | Performed by: PHYSICAL THERAPIST

## 2019-12-05 PROCEDURE — 97112 NEUROMUSCULAR REEDUCATION: CPT | Performed by: PHYSICAL THERAPIST

## 2019-12-05 PROCEDURE — 97110 THERAPEUTIC EXERCISES: CPT | Performed by: PHYSICAL THERAPIST

## 2019-12-11 ENCOUNTER — TREATMENT (OUTPATIENT)
Dept: PHYSICAL THERAPY | Age: 54
End: 2019-12-11
Payer: COMMERCIAL

## 2019-12-11 DIAGNOSIS — M75.22 TENDONITIS OF UPPER BICEPS TENDON OF LEFT SHOULDER: ICD-10-CM

## 2019-12-11 DIAGNOSIS — M25.512 ACUTE POSTOPERATIVE PAIN OF LEFT SHOULDER: Primary | ICD-10-CM

## 2019-12-11 DIAGNOSIS — M75.82 ROTATOR CUFF TENDONITIS, LEFT: ICD-10-CM

## 2019-12-11 DIAGNOSIS — G89.18 ACUTE POSTOPERATIVE PAIN OF LEFT SHOULDER: Primary | ICD-10-CM

## 2019-12-11 PROCEDURE — 97112 NEUROMUSCULAR REEDUCATION: CPT | Performed by: PHYSICAL THERAPIST

## 2019-12-11 PROCEDURE — 97110 THERAPEUTIC EXERCISES: CPT | Performed by: PHYSICAL THERAPIST

## 2019-12-11 PROCEDURE — 97140 MANUAL THERAPY 1/> REGIONS: CPT | Performed by: PHYSICAL THERAPIST

## 2019-12-16 ENCOUNTER — TREATMENT (OUTPATIENT)
Dept: PHYSICAL THERAPY | Age: 54
End: 2019-12-16
Payer: COMMERCIAL

## 2019-12-16 DIAGNOSIS — M25.512 ACUTE POSTOPERATIVE PAIN OF LEFT SHOULDER: Primary | ICD-10-CM

## 2019-12-16 DIAGNOSIS — M75.22 TENDONITIS OF UPPER BICEPS TENDON OF LEFT SHOULDER: ICD-10-CM

## 2019-12-16 DIAGNOSIS — G89.18 ACUTE POSTOPERATIVE PAIN OF LEFT SHOULDER: Primary | ICD-10-CM

## 2019-12-16 PROCEDURE — 97110 THERAPEUTIC EXERCISES: CPT | Performed by: PHYSICAL THERAPIST

## 2019-12-16 PROCEDURE — 97112 NEUROMUSCULAR REEDUCATION: CPT | Performed by: PHYSICAL THERAPIST

## 2019-12-16 PROCEDURE — 97140 MANUAL THERAPY 1/> REGIONS: CPT | Performed by: PHYSICAL THERAPIST

## 2019-12-17 ENCOUNTER — OFFICE VISIT (OUTPATIENT)
Dept: ORTHOPEDIC SURGERY | Age: 54
End: 2019-12-17

## 2019-12-17 VITALS
SYSTOLIC BLOOD PRESSURE: 154 MMHG | DIASTOLIC BLOOD PRESSURE: 99 MMHG | HEART RATE: 82 BPM | WEIGHT: 205.03 LBS | BODY MASS INDEX: 27.77 KG/M2 | HEIGHT: 72 IN

## 2019-12-17 DIAGNOSIS — M75.22 BICEPS TENDONITIS ON LEFT: ICD-10-CM

## 2019-12-17 DIAGNOSIS — Z98.890 S/P ARTHROSCOPY OF LEFT SHOULDER: ICD-10-CM

## 2019-12-17 DIAGNOSIS — M25.512 LEFT SHOULDER PAIN, UNSPECIFIED CHRONICITY: Primary | ICD-10-CM

## 2019-12-17 PROCEDURE — 99024 POSTOP FOLLOW-UP VISIT: CPT | Performed by: PHYSICIAN ASSISTANT

## 2019-12-20 ENCOUNTER — TREATMENT (OUTPATIENT)
Dept: PHYSICAL THERAPY | Age: 54
End: 2019-12-20
Payer: COMMERCIAL

## 2019-12-20 DIAGNOSIS — G89.18 ACUTE POSTOPERATIVE PAIN OF LEFT SHOULDER: Primary | ICD-10-CM

## 2019-12-20 DIAGNOSIS — M75.22 TENDONITIS OF UPPER BICEPS TENDON OF LEFT SHOULDER: ICD-10-CM

## 2019-12-20 DIAGNOSIS — M25.512 ACUTE POSTOPERATIVE PAIN OF LEFT SHOULDER: Primary | ICD-10-CM

## 2019-12-20 DIAGNOSIS — M75.82 ROTATOR CUFF TENDONITIS, LEFT: ICD-10-CM

## 2019-12-20 PROCEDURE — 97110 THERAPEUTIC EXERCISES: CPT | Performed by: PHYSICAL THERAPIST

## 2019-12-20 PROCEDURE — 97112 NEUROMUSCULAR REEDUCATION: CPT | Performed by: PHYSICAL THERAPIST

## 2019-12-20 PROCEDURE — 97140 MANUAL THERAPY 1/> REGIONS: CPT | Performed by: PHYSICAL THERAPIST

## 2020-01-02 ENCOUNTER — TREATMENT (OUTPATIENT)
Dept: PHYSICAL THERAPY | Age: 55
End: 2020-01-02
Payer: COMMERCIAL

## 2020-01-02 PROCEDURE — 97140 MANUAL THERAPY 1/> REGIONS: CPT | Performed by: PHYSICAL THERAPIST

## 2020-01-02 PROCEDURE — 97110 THERAPEUTIC EXERCISES: CPT | Performed by: PHYSICAL THERAPIST

## 2020-01-02 PROCEDURE — 97112 NEUROMUSCULAR REEDUCATION: CPT | Performed by: PHYSICAL THERAPIST

## 2020-01-02 NOTE — FLOWSHEET NOTE
[facility]   Phone: [phone]    Fax: [phone]        Physical Therapy Daily Treatment Note  Date:  2020    Patient Name:  Jyoti Wilson    :  1965  MRN: <[id number]>  Restrictions/Precautions:    Physician Information:  Referring Practitioner: Dr. Bourne Reasons  Medical/Treatment Diagnosis Information:  · Diagnosis: M75.22 (ICD-10-CM) - Bicipital tendinitis, left shoulder   s/p LEFT SHOULDER   extensive debridement of labrum and rotator interval, arthroscopic lysis of subdeltoid subacromial adhesions, partial synovectomy, open subpectoral biceps tenodesis  DOS:  19  · Treatment Diagnosis:left shoulder pain  M25.512  [] Conservative / [x] Surgical - DOS: 19  Therapy Diagnosis/Practice Pattern:  Practice Pattern I: Bony or Soft Tissue Surgery  Insurance/Certification information:  PT Insurance Information: [facility]  Plan of care signed: [] YES  [] NO  Number of Comorbidities:  []0     [x]1-2    []3+    Date of Patient follow up with Physician:  20     Quick Dash  93  %  19    Progress Note: []  Yes  []  No  Next due by: Visit #10        Latex Allergy:  [x]NO      []YES  Preferred Language for Healthcare:   [x]English       []other:    Visit # Insurance Allowable Reporting Period   3 12   Begin Date:                End Date:  2020       SUBJECTIVE:   4 months post op  He states he has been a little under the weather the past few days. Just now starting to feel better. OBJECTIVE:   Observation:   Palpation:   Pt is having difficulty controlling mm guarding. Pt does not have firm end feel.  Tightness noted in pec, subscapularis and levator w/ min tenderness;        ROM PROM-supine AROM-standing  Comment    L R L R 19   Flexion 160   135 165    Abduction 145       ER @ 45 70        BB/IR   L2 T9    OH/ER   Mid CV T4    Other             Strength:   Deltoid  IR  ER L  4/5*  4+/5*  4/5* R  5/5  5/5  5/5 Comment  12/5/19    *denotes pain with activity       Special Tests Results/Comment: deferred secondary to surgery        RESTRICTIONS/PRECAUTIONS: cervical fusion, HBP,  2 left shoulder surgeries (biceps tenodesis most recent)         Exercises/Interventions:   Therapeutic Ex (35784) Sets/sec Reps Notes/CUES   AD UE BIKE            STRETCHING      Pulleys 5min     Wand flexion 5sec 10    Wand ER sitting/supine at 0/45/90 5sec 15 each    UE [place]      Pendulum      Doorway      Wall Slides 5 sec  10       TP BB 10sec 10 Pulling across to the side      SDLY Sleeper 10sec 10                ISOMETRICS      Gripping      Retraction      Abduction 5sec 10 Ball on wall   Flexion      Internal Rotation      External Rotation            STRENGTHENING-PREs      Flexion 2 10 Supine bilat punch and OH 3# in hand   Abduction      Internal Rotation      External Rotation 3 10 2#   Shrugs      Biceps 3 10 4#   Triceps      Retraction      Extension      Horizontal Abduction in ER      Serratus                        THERABANDS/CABLE COLUMN      Rows 3 10 purple   Lats 3 10 yellow   Extension      Internal Rotation 3 10 purple   External Rotation 3 10 purple   Biceps      Triceps      PNF                                    Manual Intervention (42098)      Scar Massage      STM      Hawkgrips      GH joint mobilizations  x5' Long distraction/oscillations  Grade 1-2   Manual PROM all motions  x10'                NMR re-education (65562)   CUES NEEDED   Plyoback      Therabar oscillations      Body Blade       Rhythmic Stabilization      Supine AROM x30 all  Fw/bw, sd/sd, cw/ccw  2# weight in hand   Ball on the wall Cw/ccw x30   2# plyoball                     Therapeutic Activity (86789)      Core training      Swiss ball activities      Education                          Therapeutic Exercise and NMR EXR  [x] (64213) Provided verbal/tactile cueing for activities related to strengthening, flexibility, endurance, ROM  for declined    Charges:   TIME IN:  10:25 am        TIME OUT:    11:25  am    Timed Code Treatment Minutes: 40   Total Treatment Minutes: 60     [] EVAL (LOW) 22554 (typically 20 minutes face-to-face)    [] EVAL (MOD) 74251 (typically 30 minutes face-to-face)  [] EVAL (HIGH) 78297 (typically 45 minutes face-to-face)  [] RE-EVAL     [x] NW(03719) x  1   10:25-10:40  [] IONTO   [x] NMR (34355) x  1    10:40-10:55        []   [x] Manual (67360) x  1  11:00:11:10    [] Other:    [] TA x       [] Mech Traction (29681)  [] ES(attended) (16639)      [] ES (un) (06388):     GOALS:  Patient stated goal: Able to use my arm. Therapist goals for Patient:   Short Term Goals: To be achieved in: 2 weeks  1. Independent in HEP and progression per patient tolerance, in order to prevent re-injury. -MET  2. Patient will have a decrease in pain to facilitate improvement in movement, function, and ADLs as indicated by Functional Deficits. -PROGRESSING    Long Term Goals: To be achieved in: 12 weeks  1. Disability index score of 35% or less for the [facility] to assist with reaching prior level of function. -PROGRESSING  2. Patient will demonstrate increased AROM to 150 flex, 90 ER, and 70 IR to allow for proper joint functioning as indicated by patients Functional Deficits. -PROGRESSING  3. Patient will demonstrate an increase in Strength to 4+/ 5 throughout left UE to allow for proper functional mobility as indicated by patients Functional Deficits. -PROGRESSING  4. Patient will return to dressing, driving, and maintain home without increased symptoms or restriction. -PARTIALLY MET  5. Participate in work conditioning/ work hardening for safe return to work force.    -WORKING TOWARDS  GOALS UPDATED 11/12/19    New or Updated Goals (if applicable):  [x] No change to goals established upon initial eval/last progress note:  New Goals:    Progression Towards Functional goals:   [] Patient is progressing as expected towards functional

## 2020-01-10 ENCOUNTER — TREATMENT (OUTPATIENT)
Dept: PHYSICAL THERAPY | Age: 55
End: 2020-01-10
Payer: COMMERCIAL

## 2020-01-10 PROCEDURE — 97140 MANUAL THERAPY 1/> REGIONS: CPT | Performed by: PHYSICAL THERAPIST

## 2020-01-10 PROCEDURE — 97110 THERAPEUTIC EXERCISES: CPT | Performed by: PHYSICAL THERAPIST

## 2020-01-10 PROCEDURE — 97112 NEUROMUSCULAR REEDUCATION: CPT | Performed by: PHYSICAL THERAPIST

## 2020-01-10 NOTE — FLOWSHEET NOTE
[facility]   Phone: [phone]    Fax: [phone]        Physical Therapy Daily Treatment Note  Date:  1/10/2020    Patient Name:  Rina Garcia    :  1965  MRN: <[id number]>  Restrictions/Precautions:    Physician Information:  Referring Practitioner: Dr. Carla Green  Medical/Treatment Diagnosis Information:  · Diagnosis: M75.22 (ICD-10-CM) - Bicipital tendinitis, left shoulder   s/p LEFT SHOULDER   extensive debridement of labrum and rotator interval, arthroscopic lysis of subdeltoid subacromial adhesions, partial synovectomy, open subpectoral biceps tenodesis  DOS:  19  · Treatment Diagnosis:left shoulder pain  M25.512  [] Conservative / [x] Surgical - DOS: 19  Therapy Diagnosis/Practice Pattern:  Practice Pattern I: Bony or Soft Tissue Surgery  Insurance/Certification information:  PT Insurance Information: [facility]  Plan of care signed: [] YES  [] NO  Number of Comorbidities:  []0     [x]1-2    []3+    Date of Patient follow up with Physician:  20     Quick Dash  93  %  19    Progress Note: []  Yes  []  No  Next due by: Visit #10        Latex Allergy:  [x]NO      []YES  Preferred Language for Healthcare:   [x]English       []other:    Visit # Insurance Allowable Reporting Period   4 12   Begin Date:                End Date:  2020       SUBJECTIVE:   4+ months post op  He saw his spine MD yesterday and he told him there was really nothing else he could do for him. His nerve condition hasn't really changed over the past year. His shoulder is still about the same. OBJECTIVE:   Observation:   Palpation:   Pt is having difficulty controlling mm guarding. Pt does not have firm end feel.  Tightness noted in pec, subscapularis and levator w/ min tenderness;        ROM PROM-supine AROM-standing  Comment    L R L R 19   Flexion 160   135 165    Abduction 145       ER @ 45 70        BB/IR   L2 T9    OH/ER   Mid CV T4 Other             Strength:   Deltoid  IR  ER L  4/5*  4+/5*  4/5* R  5/5  5/5  5/5 Comment  12/5/19    *denotes pain with activity       Special Tests Results/Comment: deferred secondary to surgery        RESTRICTIONS/PRECAUTIONS: cervical fusion, HBP,  2 left shoulder surgeries (biceps tenodesis most recent)         Exercises/Interventions:   Therapeutic Ex (01939) Sets/sec Reps Notes/CUES   AD UE BIKE            STRETCHING      Pulleys 5min     Table Slides Flexion                       Scaption 5sec 15 Inclined    Wand flexion 5sec 15    Wand ER sitting/supine at 0/45/90 5sec 15 each    UE [place]      Pendulum      Doorway      Wall Slides 5 sec  10       TP BB 10sec 10 Pulling across to the side      SDLY Sleeper 10sec 10                ISOMETRICS      Gripping      Retraction      Abduction 5sec 10 Ball on wall   Flexion      Internal Rotation      External Rotation            STRENGTHENING-PREs      Flexion 3 10 Supine bilat punch and OH 3# in hand   Abduction      Internal Rotation      External Rotation 3 10 2#   Shrugs      Biceps 3 10 4#   Triceps      Retraction      Extension      Horizontal Abduction in ER      Serratus                        THERABANDS/CABLE COLUMN      Rows 3 10 tan   Lats 3 10 yellow   Extension      Internal Rotation 3 10 purple   External Rotation 3 10 purple   Biceps      Triceps      PNF                                    Manual Intervention (78119)      Scar Massage      STM      Hawkgrips      GH joint mobilizations  x5' Long distraction/oscillations  Grade 1-2   Manual PROM all motions  x10'                NMR re-education (77312)   CUES NEEDED   Plyoback      Therabar oscillations      Body Blade       Rhythmic Stabilization      Supine AROM x30 all  Fw/bw, sd/sd, cw/ccw  2# weight in hand   Ball on the wall Cw/ccw x30   2# plyoball                     Therapeutic Activity (46960)      Core training      Swiss ball activities      Education Therapeutic Exercise and NMR EXR  [x] (36084) Provided verbal/tactile cueing for activities related to strengthening, flexibility, endurance, ROM  for improvements in scapular, scapulothoracic and UE control with self care, reaching, carrying, lifting, house/yardwork, driving/computer work. [x] (51395) Provided verbal/tactile cueing for activities related to improving balance, coordination, kinesthetic sense, posture, motor skill, proprioception  to assist with  scapular, scapulothoracic and UE control with self care, reaching, carrying, lifting, house/yardwork, driving/computer work. Therapeutic Activities:    [x] (18194 or 16908) Provided verbal/tactile cueing for activities related to improving balance, coordination, kinesthetic sense, posture, motor skill, proprioception and motor activation to allow for proper function of scapular, scapulothoracic and UE control with self care, carrying, lifting, driving/computer work.      Home Exercise Program:    [x] (97258) Reviewed/Progressed HEP activities related to strengthening, flexibility, endurance, ROM of scapular, scapulothoracic and UE control with self care, reaching, carrying, lifting, house/yardwork, driving/computer work  [] (87038) Reviewed/Progressed HEP activities related to improving balance, coordination, kinesthetic sense, posture, motor skill, proprioception of scapular, scapulothoracic and UE control with self care, reaching, carrying, lifting, house/yardwork, driving/computer work      Manual Treatments:  PROM / STM / Oscillations-Mobs:  G-I, II, III, IV (PA's, Inf., Post.)  [x] (27498) Provided manual therapy to mobilize soft tissue/joints of cervical/CT, scapular GHJ and UE for the purpose of modulating pain, promoting relaxation,  increasing ROM, reducing/eliminating soft tissue swelling/inflammation/restriction, improving soft tissue extensibility and allowing for proper ROM for normal function with self care, reaching, carrying, lifting, house/yardwork, driving/computer work    Modalities:      [] GR/ESU 15 min    [] GR 15 min   [] ESU     [x] CP x 15 min    [] MHP    [] declined    Charges:   TIME IN:  9:00 am        TIME OUT: 10:10  am    Timed Code Treatment Minutes: 40   Total Treatment Minutes: 70     [] EVAL (LOW) 63186 (typically 20 minutes face-to-face)    [] EVAL (MOD) 45693 (typically 30 minutes face-to-face)  [] EVAL (HIGH) 76582 (typically 45 minutes face-to-face)  [] RE-EVAL     [x] AS(56776) x  1   9:00-9:15  [] IONTO   [x] NMR (98065) x  1    9:15-9:30        []   [x] Manual (18089) x  1  9:40-9:50    [] Other:    [] TA x       [] Mech Traction (07325)  [] ES(attended) (25214)      [] ES (un) (19199):     GOALS:  Patient stated goal: Able to use my arm. Therapist goals for Patient:   Short Term Goals: To be achieved in: 2 weeks  1. Independent in HEP and progression per patient tolerance, in order to prevent re-injury. -MET  2. Patient will have a decrease in pain to facilitate improvement in movement, function, and ADLs as indicated by Functional Deficits. -PROGRESSING    Long Term Goals: To be achieved in: 12 weeks  1. Disability index score of 35% or less for the [facility] to assist with reaching prior level of function. -PROGRESSING  2. Patient will demonstrate increased AROM to 150 flex, 90 ER, and 70 IR to allow for proper joint functioning as indicated by patients Functional Deficits. -PROGRESSING  3. Patient will demonstrate an increase in Strength to 4+/ 5 throughout left UE to allow for proper functional mobility as indicated by patients Functional Deficits. -PROGRESSING  4. Patient will return to dressing, driving, and maintain home without increased symptoms or restriction. -PARTIALLY MET  5. Participate in work conditioning/ work hardening for safe return to work force.    -WORKING TOWARDS  GOALS UPDATED 11/12/19    New or Updated Goals (if applicable):  [x] No change to goals established upon initial eval/last progress note:  New Goals:    Progression Towards Functional goals:   [] Patient is progressing as expected towards functional goals listed. [x] Progression is slowed due to complexities listed. 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 [] Plan just implemented, too soon to assess goals progression  [] Other:     ASSESSMENT:   Beginning to see smoothness with some of his AAROM, like wall slides. Very fatigued with resisted ER. His complains of burning type of pain and some discomfort in the posterior aspect of his shoulder. Still has visible shakiness in his upper extremity with supine press to [place]. Treatment/Activity Tolerance:  [] Patient tolerated treatment well [x] Patient limited by fatique  [x] Patient limited by pain  [] Patient limited by other medical complications  [] Other:     Prognosis: [] Good [x] Fair  [] Poor    Patient Requires Follow-up: [x] Yes  [] No    PLAN: He will be continuing PT here at this office 2x/week.      Continue PT pending further visit approval.   Recommend 2x/week for another 6 weeks (12 visits)  [x] Continue per plan of care [] Alter current plan (see comments)  [] Plan of care initiated [] Hold pending MD visit [] Discharge    Electronically signed by     Jeffrey Cho [id number] #[id number]

## 2020-01-14 ENCOUNTER — TREATMENT (OUTPATIENT)
Dept: PHYSICAL THERAPY | Age: 55
End: 2020-01-14
Payer: COMMERCIAL

## 2020-01-14 PROCEDURE — 97112 NEUROMUSCULAR REEDUCATION: CPT | Performed by: PHYSICAL THERAPIST

## 2020-01-14 PROCEDURE — 97140 MANUAL THERAPY 1/> REGIONS: CPT | Performed by: PHYSICAL THERAPIST

## 2020-01-14 PROCEDURE — 97110 THERAPEUTIC EXERCISES: CPT | Performed by: PHYSICAL THERAPIST

## 2020-01-16 ENCOUNTER — TREATMENT (OUTPATIENT)
Dept: PHYSICAL THERAPY | Age: 55
End: 2020-01-16
Payer: COMMERCIAL

## 2020-01-16 ENCOUNTER — OFFICE VISIT (OUTPATIENT)
Dept: ORTHOPEDIC SURGERY | Age: 55
End: 2020-01-16
Payer: COMMERCIAL

## 2020-01-16 VITALS
HEIGHT: 72 IN | RESPIRATION RATE: 12 BRPM | WEIGHT: 205.03 LBS | SYSTOLIC BLOOD PRESSURE: 138 MMHG | BODY MASS INDEX: 27.77 KG/M2 | DIASTOLIC BLOOD PRESSURE: 96 MMHG | HEART RATE: 96 BPM

## 2020-01-16 PROCEDURE — 99213 OFFICE O/P EST LOW 20 MIN: CPT | Performed by: ORTHOPAEDIC SURGERY

## 2020-01-16 PROCEDURE — 97112 NEUROMUSCULAR REEDUCATION: CPT | Performed by: PHYSICAL THERAPIST

## 2020-01-16 PROCEDURE — 97110 THERAPEUTIC EXERCISES: CPT | Performed by: PHYSICAL THERAPIST

## 2020-01-16 PROCEDURE — 97140 MANUAL THERAPY 1/> REGIONS: CPT | Performed by: PHYSICAL THERAPIST

## 2020-01-16 NOTE — FLOWSHEET NOTE
[facility]   Phone: [phone]    Fax: [phone]        Physical Therapy Daily Treatment Note  Date:  2020    Patient Name:  Ernst Rodriguez    :  1965  MRN: <[id number]>  Restrictions/Precautions:    Physician Information:  Referring Practitioner: Dr. Kevan Dye  Medical/Treatment Diagnosis Information:  · Diagnosis: M75.22 (ICD-10-CM) - Bicipital tendinitis, left shoulder   s/p LEFT SHOULDER   extensive debridement of labrum and rotator interval, arthroscopic lysis of subdeltoid subacromial adhesions, partial synovectomy, open subpectoral biceps tenodesis  DOS:  19  · Treatment Diagnosis:left shoulder pain  M25.512  [] Conservative / [x] Surgical - DOS: 19  Therapy Diagnosis/Practice Pattern:  Practice Pattern I: Bony or Soft Tissue Surgery  Insurance/Certification information:  PT Insurance Information: [facility]  Plan of care signed: [] YES  [] NO  Number of Comorbidities:  []0     [x]1-2    []3+    Date of Patient follow up with Physician:  20     Quick Dash  93  %  19    Progress Note: []  Yes  []  No  Next due by: Visit #10        Latex Allergy:  [x]NO      []YES  Preferred Language for Healthcare:   [x]English       []other:    Visit # Insurance Allowable Reporting Period   6 12   Begin Date:                End Date:  2020       SUBJECTIVE:   5 months post op  Patient saw Dr Emily Cordero today. He says he had 2 different doctors examine his shoulder and they really \"pushed and pulled\" a lot. To the point where it really flared him up and he is not sure he can do much at therapy today. They stated he is still very weak throughout his shoulder. OBJECTIVE:   Observation:   Palpation:   Pt is having difficulty controlling mm guarding. Pt does not have firm end feel.  Tightness noted in pec, subscapularis and levator w/ min tenderness;        ROM PROM-supine AROM-standing  Comment    L R L R 19   Flexion 160   135 165    Abduction 145       ER @ 45 70        BB/IR   L2 T9    OH/ER   Mid CV T4    Other             Strength:   Deltoid  IR  ER L  4/5*  4+/5*  4/5* R  5/5  5/5  5/5 Comment  12/5/19    *denotes pain with activity       Special Tests Results/Comment: deferred secondary to surgery        RESTRICTIONS/PRECAUTIONS: cervical fusion, HBP,  2 left shoulder surgeries (biceps tenodesis most recent)         Exercises/Interventions:   Therapeutic Ex (25505) Sets/sec Reps Notes/CUES   AD UE BIKE            STRETCHING      Pulleys 5min     Table Slides Flexion                       Scaption 5sec 15 Inclined    Wand flexion 5sec 15    Wand ER sitting/supine at 0/45/90 5sec 15 each    UE [place]      Pendulum      Doorway      Wall Slides 5 sec  10       TP BB 10sec 10 Pulling across to the side      SDLY Sleeper 10sec 10                ISOMETRICS      Gripping      Retraction      Abduction 5sec 10 Ball on wall   Flexion      Internal Rotation      External Rotation            STRENGTHENING-PREs      Abduction      Internal Rotation      Shrugs      Triceps      Retraction      Extension      Horizontal Abduction in ER      Serratus                        THERABANDS/CABLE COLUMN      Rows 3 10 tan   Lats 3 10 yellow   Extension      Internal Rotation 3 10 purple   External Rotation 3 10 purple   Biceps      Triceps      PNF                                    Manual Intervention (36106)      Scar Massage      STM      Hawkgrips      GH joint mobilizations  x5' Long distraction/oscillations  Grade 1-2   Manual PROM all motions  x10'                NMR re-education (81192)   CUES NEEDED   Plyoback      Therabar oscillations      Body Blade       Rhythmic Stabilization      Supine AROM x30 all  Fw/bw, sd/sd, cw/ccw  2# weight in hand   Ball on the wall Cw/ccw x30   2# plyoball                   Therapeutic Activity (20872)      Core training      Swiss ball activities      Education                          Therapeutic Exercise and NMR EXR  [x] (92472) Provided verbal/tactile cueing for activities related to strengthening, flexibility, endurance, ROM  for improvements in scapular, scapulothoracic and UE control with self care, reaching, carrying, lifting, house/yardwork, driving/computer work. [x] (22075) Provided verbal/tactile cueing for activities related to improving balance, coordination, kinesthetic sense, posture, motor skill, proprioception  to assist with  scapular, scapulothoracic and UE control with self care, reaching, carrying, lifting, house/yardwork, driving/computer work. Therapeutic Activities:    [x] (61388 or 52741) Provided verbal/tactile cueing for activities related to improving balance, coordination, kinesthetic sense, posture, motor skill, proprioception and motor activation to allow for proper function of scapular, scapulothoracic and UE control with self care, carrying, lifting, driving/computer work.      Home Exercise Program:    [x] (12757) Reviewed/Progressed HEP activities related to strengthening, flexibility, endurance, ROM of scapular, scapulothoracic and UE control with self care, reaching, carrying, lifting, house/yardwork, driving/computer work  [] (33509) Reviewed/Progressed HEP activities related to improving balance, coordination, kinesthetic sense, posture, motor skill, proprioception of scapular, scapulothoracic and UE control with self care, reaching, carrying, lifting, house/yardwork, driving/computer work      Manual Treatments:  PROM / STM / Oscillations-Mobs:  G-I, II, III, IV (PA's, Inf., Post.)  [x] (24941) Provided manual therapy to mobilize soft tissue/joints of cervical/CT, scapular GHJ and UE for the purpose of modulating pain, promoting relaxation,  increasing ROM, reducing/eliminating soft tissue swelling/inflammation/restriction, improving soft tissue extensibility and allowing for proper ROM for normal function with self care, reaching, carrying, lifting, house/yardwork, driving/computer work    Modalities:      [] GR/ESU 15 min    [] GR 15 min   [] ESU     [x] CP x 15 min    [] MHP    [] declined    Charges:   TIME IN: 11:40 am        TIME OUT:  12:45 pm    Timed Code Treatment Minutes: 40   Total Treatment Minutes: 65     [] EVAL (LOW) 87864 (typically 20 minutes face-to-face)    [] EVAL (MOD) 00005 (typically 30 minutes face-to-face)  [] EVAL (HIGH) 47984 (typically 45 minutes face-to-face)  [] RE-EVAL     [x] LS(26028) x  1   11:45-12:00  [] IONTO   [x] NMR (70616) x  1    12:05-12:15        []   [x] Manual (15864) x  1  12:20-12:30    [] Other:    [] TA x       [] Mech Traction (48530)  [] ES(attended) (36034)      [] ES (un) (98659):     GOALS:  Patient stated goal: Able to use my arm. Therapist goals for Patient:   Short Term Goals: To be achieved in: 2 weeks  1. Independent in HEP and progression per patient tolerance, in order to prevent re-injury. -MET  2. Patient will have a decrease in pain to facilitate improvement in movement, function, and ADLs as indicated by Functional Deficits. -PROGRESSING    Long Term Goals: To be achieved in: 12 weeks  1. Disability index score of 35% or less for the [facility] to assist with reaching prior level of function. -PROGRESSING  2. Patient will demonstrate increased AROM to 150 flex, 90 ER, and 70 IR to allow for proper joint functioning as indicated by patients Functional Deficits. -PROGRESSING  3. Patient will demonstrate an increase in Strength to 4+/ 5 throughout left UE to allow for proper functional mobility as indicated by patients Functional Deficits. -PROGRESSING  4. Patient will return to dressing, driving, and maintain home without increased symptoms or restriction. -PARTIALLY MET  5. Participate in work conditioning/ work hardening for safe return to work force.    -WORKING TOWARDS  GOALS UPDATED 11/12/19    New or Updated Goals (if applicable):  [x] No change to goals established upon initial eval/last progress

## 2020-01-16 NOTE — PROGRESS NOTES
[facility]  History and Physical  Shoulder Pain    Date:  2020    Name:  Silas Valero  Address:  [address]    :  1965      Age:   48 y.o.    SSN:  [id number]      Medical Record Number:  <[id number]>    Reason for Visit:    Shoulder Pain (L SHOULDER WC F/U.)      HPI:   Silas Valero is a 48 y.o. male who presents to our office today for follow up of the left shoulder pain. Patient underwent a left shoulder arthroscopy with extensive debridement, and open subpectoral biceps tenodesis 2019. He continues to have shoulder pain while working with physical therapy and reports a kind of catching sensation when he brings his arm down from forward elevation at the anterior shoulder. He is working with Richerd Nyhan at [facility] for physical therapy. He has no new injuries or setbacks. He does feel like he continues to make progress. He did have a prior MRI reviewed at the last visit 2019 that showed his repair was intact. He denies fevers, chills, chest pain, shortness of breath. He does endorse some numbness and tingling that is since the injury in his hand from his neck injury. Pain Assessment  Location of Pain: Shoulder  Location Modifiers: Left  Severity of Pain: 5  Quality of Pain: Dull, Aching, Sharp  Duration of Pain: Persistent  Frequency of Pain: Constant  Aggravating Factors: Other (Comment), Bending, Straightening(OH MOVEMENT)  Relieving Factors: Rest, Ice  Result of Injury: Yes  Work-Related Injury: Yes  Are there other pain locations you wish to document?: No    No notes on file    Review of Systems:  A 14 point review of systems available in the scanned medical record as documented by the patient. The review is negative with the exception of those things mentioned in the History of Present Illness and Past Medical History.       Past Medical History:  Patient's medications, allergies, past medical, surgical, social and family histories were reviewed and updated as appropriate. Allergies:  No Known Allergies          Physical Exam:  BP (!) 138/96   Pulse 96   Resp 12   Ht 6' 0.01\" (1.829 m)   Wt 205 lb 0.4 oz (93 kg)   BMI 27.80 kg/m²     General: No acute distress, well nourished  CV: No obvious peripheral edema. Normal peripheral pulses  Neuro: Alert & oriented x 3  Psych: Normal mood and affect    Left Upper Extremity Exam:      FF Abd ER IR   Active  150 30 T10   Passive ROM same      Strength (x/5)  5+ Jobes  5 Champagne toast 4+ 5     Skin: Incisions well-healed without evidence of erythema, tenderness or drainage. No skin rashes or lesions, warm, well perfused  Inspection: No Darío deformity, mild biceps atrophy  Palpation: Small amount of crepitus with range of motion. Tender over the anterior cuff and AC joint. Special Tests: Mild pain with Lavera Hasten, Neer's tests. Contralateral right upper Extremity Exam:      FF Abd ER IR   Active  160 30 T6   Passive ROM       Strength (x/5)  5 Jobes  5 Champagne toast 5 5       Assessment:  Feliberto Trujillo is a 48 y.o. male who underwent a left shoulder arthroscopy with extensive debridement and open subpectoral biceps tenodesis. He should do well with continued rehabilitation. Impression:  Encounter Diagnosis   Name Primary?  S/P arthroscopy of left shoulder Yes       Office Procedures:  Orders Placed This Encounter   Procedures    Ambulatory referral to Physical Therapy     Referral Priority:   Routine     Referral Type:   Eval and Treat     Referral Reason:   Specialty Services Required     Requested Specialty:   Physical Therapy     Number of Visits Requested:   1       Plan:   Pertinent imaging was reviewed. The etiology, natural history, and treatment options for the disorder were discussed.   The roles of activity modifications, medications, injections, physical therapy, and surgical interventions were

## 2020-01-16 NOTE — LETTER
Shoulder Elbow Rehabilitation Referral    Patient Name: Jeffrey Álvarez      YOB: 1965    Diagnosis: Left ulnar arthroscopy and biceps tenodesis. Signs of impingement  Precautions: None    Post Op Instructions:  [] Continuous passive motion (CPM)  [] Elbow range of motion  [] Exercise in plane of scapula  []  Strengthening     [] Pulley and instruction   [] Home exercise program (copy to patient)   [] Sling when arm at risk  [] Sling or brace at all times   [] AAROM: Forward elevation to             [] AAROM: External rotation  To      [] Isometric external rotator strengthening [] AAROM: internal rotation: up the back  [] Isometric abductor strengthening  [] AAROM: Internal abduction     [] Isometric internal rotator strengthening [] AAROM: cross-body adduction             Stretching:     Strengthening:  [x] Four quadrant (FE, ER, IR, CBA)  [x] Sleeper stretch    [x] Rotator cuff (ER, IR, Abd)  [x] Forward Elevation    [x] External Rotators     [x] External Rotation    [x] Internal Rotators  [x] Internal Rotation: up/back   [x] Abductors     [x] Internal Rotation: supine in abduction [x] Flexors  [x] Cross-body abduction    [x] Extensors  [] Pendulum (FE, Abd/Add, cw/ccw)  [x] Scapular Stabilizers   [] Wall-walking (FE, Abd)   [x] Shoulder shrugs     [] Table slides     [x] Rhomboid pinch  [] Elbow (flex, ext, pron, sup)   [] Lat.  Pull downs     [] Medial epicondylitis program  [] Forward punch   [] Lateral epicondylitis program  [] Internal rotators     [] Progressive resistive exercises  [] Bench Press        [] Bench press plus  Activities:     [] Lateral pull-downs  [] Rowing     [] Progressive two-hand supine press  [] Stepper/Exercise bike   [x] Biceps: curls/supination  [] Swimming  [] Water exercises    Modalities:     Return to Sport:  [] Ultrasound     [] Plyometrics  [] Iontophoresis    [] Rhythmic stabilization  [] Moist heat     [] Core strengthening

## 2020-01-23 ENCOUNTER — TREATMENT (OUTPATIENT)
Dept: PHYSICAL THERAPY | Age: 55
End: 2020-01-23
Payer: COMMERCIAL

## 2020-01-23 PROCEDURE — 97140 MANUAL THERAPY 1/> REGIONS: CPT | Performed by: PHYSICAL THERAPIST

## 2020-01-23 PROCEDURE — 97112 NEUROMUSCULAR REEDUCATION: CPT | Performed by: PHYSICAL THERAPIST

## 2020-01-23 PROCEDURE — 97110 THERAPEUTIC EXERCISES: CPT | Performed by: PHYSICAL THERAPIST

## 2020-01-23 NOTE — FLOWSHEET NOTE
Comment  12/5/19    *denotes pain with activity       Special Tests Results/Comment: deferred secondary to surgery        RESTRICTIONS/PRECAUTIONS: cervical fusion, HBP,  2 left shoulder surgeries (biceps tenodesis most recent)         Exercises/Interventions:   Therapeutic Ex (89094) Sets/sec Reps Notes/CUES   AD UE BIKE            STRETCHING      Pulleys 5min     Table Slides Flexion                       Scaption 5sec 15 Inclined    Wand flexion 5sec 15    Wand ER sitting/supine at 0/45/90 5sec 15 each    UE [place]      Pendulum      Doorway      Wall Slides 5 sec  10       TP BB 10sec 10 Pulling across to the side  Then pulling up x5      SDLY Sleeper 10sec 10                ISOMETRICS      Gripping      Retraction      Abduction 5sec 10 Ball on wall   Flexion      Internal Rotation      External Rotation            STRENGTHENING-PREs      Abduction      Internal Rotation      External Rotation 3 10 2#   Shrugs      Biceps 3 10 5#   Triceps      Retraction      Extension      Horizontal Abduction in ER      Serratus                        THERABANDS/CABLE COLUMN      Rows 3 10 tan   Lats 3 10 yellow   Extension      Internal Rotation 3 10 purple   External Rotation 3 10 purple   Biceps      Triceps      PNF                                    Manual Intervention (82325)      Scar Massage      STM      Hawkgrips      GH joint mobilizations  x5' Long distraction/oscillations  Grade 1-2   Manual PROM all motions  x10'                NMR re-education (74262)   CUES NEEDED   Plyoback      Therabar oscillations      Body Blade       Rhythmic Stabilization      Supine AROM x30 all  Fw/bw, sd/sd, cw/ccw  2# weight in hand   Ball on the wall Cw/ccw x30   2# plyoball                   Therapeutic Activity (85359)      Core training      Swiss ball activities      Education                          Therapeutic Exercise and NMR EXR  [x] (73432) Provided verbal/tactile cueing for activities related to strengthening, functional goals listed. [x] Progression is slowed due to complexities listed. 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 [] Plan just implemented, too soon to assess goals progression  [] Other:     ASSESSMENT:   Able to resume his full prior workout today. He fatigues most with resisted ER. Really complains of burning discomfort with this. He is moving well through his available motion. Noticeable shaking his left UE mostly noted during his last exercise of supine punch and OH with weights in his hands. Treatment/Activity Tolerance:  [] Patient tolerated treatment well [x] Patient limited by fatique  [x] Patient limited by pain  [] Patient limited by other medical complications  [] Other:     Prognosis: [] Good [x] Fair  [] Poor    Patient Requires Follow-up: [x] Yes  [] No    PLAN: He will be continuing PT here at this office 2x/week.      Continue PT pending further visit approval.   Recommend 2x/week for another 6 weeks (12 visits)  [x] Continue per plan of care [] Alter current plan (see comments)  [] Plan of care initiated [] Hold pending MD visit [] Discharge    Electronically signed by     Celso Kelley [address] #[id number]

## 2020-01-28 ENCOUNTER — TREATMENT (OUTPATIENT)
Dept: PHYSICAL THERAPY | Age: 55
End: 2020-01-28
Payer: COMMERCIAL

## 2020-01-28 PROCEDURE — 97110 THERAPEUTIC EXERCISES: CPT | Performed by: PHYSICAL THERAPIST

## 2020-01-28 PROCEDURE — 97112 NEUROMUSCULAR REEDUCATION: CPT | Performed by: PHYSICAL THERAPIST

## 2020-01-28 PROCEDURE — 97140 MANUAL THERAPY 1/> REGIONS: CPT | Performed by: PHYSICAL THERAPIST

## 2020-01-28 NOTE — FLOWSHEET NOTE
[facility]   Phone: [phone]    Fax: [phone]        Physical Therapy Daily Treatment Note  Date:  2020    Patient Name:  Nela Le    :  1965  MRN: <[id number]>  Restrictions/Precautions:    Physician Information:  Referring Practitioner: Dr. Heaven Garcia  Medical/Treatment Diagnosis Information:  · Diagnosis: M75.22 (ICD-10-CM) - Bicipital tendinitis, left shoulder   s/p LEFT SHOULDER   extensive debridement of labrum and rotator interval, arthroscopic lysis of subdeltoid subacromial adhesions, partial synovectomy, open subpectoral biceps tenodesis  DOS:  19  · Treatment Diagnosis:left shoulder pain  M25.512  [] Conservative / [x] Surgical - DOS: 19  Therapy Diagnosis/Practice Pattern:  Practice Pattern I: Bony or Soft Tissue Surgery  Insurance/Certification information:  PT Insurance Information: [facility]  Plan of care signed: [] YES  [] NO  Number of Comorbidities:  []0     [x]1-2    []3+    Date of Patient follow up with Physician:  2020     Vidal Hadley  93  %  19    Progress Note: []  Yes  []  No  Next due by: Visit #10        Latex Allergy:  [x]NO      []YES  Preferred Language for Healthcare:   [x]English       []other:    Visit # Insurance Allowable Reporting Period      Begin Date:                End Date:  2020       SUBJECTIVE:   5+ months post op  Doing OK. Had an uneventful weekend. OBJECTIVE:   Observation:   Palpation:   Pt is having difficulty controlling mm guarding. Pt does not have firm end feel.  Tightness noted in pec, subscapularis and levator w/ min tenderness;        ROM PROM-supine AROM-standing  Comment    L R L R 19   Flexion 160   135 165    Abduction 145       ER @ 45 70        BB/IR   L2 T9    OH/ER   Mid CV T4    Other             Strength:   Deltoid  IR  ER L  4/5*  4+/5*  4/5* R  5/5  5/5  5/5 Comment  19    *denotes pain with activity       Special Tests declined    Charges:   TIME IN: 9:00 am        TIME OUT:    10:10 am    Timed Code Treatment Minutes: 40   Total Treatment Minutes: 70     [] EVAL (LOW) 21129 (typically 20 minutes face-to-face)    [] EVAL (MOD) 23112 (typically 30 minutes face-to-face)  [] EVAL (HIGH) 26345 (typically 45 minutes face-to-face)  [] RE-EVAL     [x] Setswana(23963) x  1   9:00-9:15  [] IONTO   [x] NMR (88274) x  1    9:20-9:35       []   [x] Manual (65734) x  1  9:40-9:50    [] Other:    [] TA x       [] Mech Traction (19234)  [] ES(attended) (53736)      [] ES (un) (54525):     GOALS:  Patient stated goal: Able to use my arm. Therapist goals for Patient:   Short Term Goals: To be achieved in: 2 weeks  1. Independent in HEP and progression per patient tolerance, in order to prevent re-injury. -MET  2. Patient will have a decrease in pain to facilitate improvement in movement, function, and ADLs as indicated by Functional Deficits. -PROGRESSING    Long Term Goals: To be achieved in: 12 weeks  1. Disability index score of 35% or less for the [facility] to assist with reaching prior level of function. -PROGRESSING  2. Patient will demonstrate increased AROM to 150 flex, 90 ER, and 70 IR to allow for proper joint functioning as indicated by patients Functional Deficits. -PROGRESSING  3. Patient will demonstrate an increase in Strength to 4+/ 5 throughout left UE to allow for proper functional mobility as indicated by patients Functional Deficits. -PROGRESSING  4. Patient will return to dressing, driving, and maintain home without increased symptoms or restriction. -PARTIALLY MET  5. Participate in work conditioning/ work hardening for safe return to work force. -WORKING TOWARDS  GOALS UPDATED 11/12/19    New or Updated Goals (if applicable):  [x] No change to goals established upon initial eval/last progress note:  New Goals:    Progression Towards Functional goals:   [] Patient is progressing as expected towards functional goals listed.

## 2020-01-30 ENCOUNTER — TREATMENT (OUTPATIENT)
Dept: PHYSICAL THERAPY | Age: 55
End: 2020-01-30
Payer: COMMERCIAL

## 2020-01-30 PROCEDURE — 97110 THERAPEUTIC EXERCISES: CPT | Performed by: PHYSICAL THERAPIST

## 2020-01-30 PROCEDURE — 97112 NEUROMUSCULAR REEDUCATION: CPT | Performed by: PHYSICAL THERAPIST

## 2020-01-30 PROCEDURE — 97140 MANUAL THERAPY 1/> REGIONS: CPT | Performed by: PHYSICAL THERAPIST

## 2020-01-30 NOTE — FLOWSHEET NOTE
[facility]   Phone: [phone]    Fax: [phone]        Physical Therapy Daily Treatment Note  Date:  2020    Patient Name:  Natalya Milton    :  1965  MRN: <[id number]>  Restrictions/Precautions:    Physician Information:  Referring Practitioner: Dr. Richmond Thrasher  Medical/Treatment Diagnosis Information:  · Diagnosis: M75.22 (ICD-10-CM) - Bicipital tendinitis, left shoulder   s/p LEFT SHOULDER   extensive debridement of labrum and rotator interval, arthroscopic lysis of subdeltoid subacromial adhesions, partial synovectomy, open subpectoral biceps tenodesis  DOS:  19  · Treatment Diagnosis:left shoulder pain  M25.512  [] Conservative / [x] Surgical - DOS: 19  Therapy Diagnosis/Practice Pattern:  Practice Pattern I: Bony or Soft Tissue Surgery  Insurance/Certification information:  PT Insurance Information: [facility]  Plan of care signed: [] YES  [] NO  Number of Comorbidities:  []0     [x]1-2    []3+    Date of Patient follow up with Physician:  2020     Michelle Lax  93  %  19    Progress Note: []  Yes  []  No  Next due by: Visit #10        Latex Allergy:  [x]NO      []YES  Preferred Language for Healthcare:   [x]English       []other:    Visit # Insurance Allowable Reporting Period   9 12   Begin Date:                End Date:  2020       SUBJECTIVE:   5+ months post op  He reports that he must have rolled out of his bed in a \"funny\" position this morning. He had a sharp pain in his lower back that kind of brought him to his knees. He almost called in, but is trying to work it out this morning. OBJECTIVE:   Observation:   Palpation:   Pt is having difficulty controlling mm guarding. Pt does not have firm end feel.  Tightness noted in pec, subscapularis and levator w/ min tenderness;        ROM PROM-supine AROM-standing  Comment    L R L R 19   Flexion 160   135 165    Abduction 145       ER @ 45 70        BB/IR EXR  [x] (83934) Provided verbal/tactile cueing for activities related to strengthening, flexibility, endurance, ROM  for improvements in scapular, scapulothoracic and UE control with self care, reaching, carrying, lifting, house/yardwork, driving/computer work. [x] (42327) Provided verbal/tactile cueing for activities related to improving balance, coordination, kinesthetic sense, posture, motor skill, proprioception  to assist with  scapular, scapulothoracic and UE control with self care, reaching, carrying, lifting, house/yardwork, driving/computer work. Therapeutic Activities:    [x] (80678 or 65575) Provided verbal/tactile cueing for activities related to improving balance, coordination, kinesthetic sense, posture, motor skill, proprioception and motor activation to allow for proper function of scapular, scapulothoracic and UE control with self care, carrying, lifting, driving/computer work.      Home Exercise Program:    [x] (13346) Reviewed/Progressed HEP activities related to strengthening, flexibility, endurance, ROM of scapular, scapulothoracic and UE control with self care, reaching, carrying, lifting, house/yardwork, driving/computer work  [] (12414) Reviewed/Progressed HEP activities related to improving balance, coordination, kinesthetic sense, posture, motor skill, proprioception of scapular, scapulothoracic and UE control with self care, reaching, carrying, lifting, house/yardwork, driving/computer work      Manual Treatments:  PROM / STM / Oscillations-Mobs:  G-I, II, III, IV (PA's, Inf., Post.)  [x] (95450) Provided manual therapy to mobilize soft tissue/joints of cervical/CT, scapular GHJ and UE for the purpose of modulating pain, promoting relaxation,  increasing ROM, reducing/eliminating soft tissue swelling/inflammation/restriction, improving soft tissue extensibility and allowing for proper ROM for normal function with self care, reaching, carrying, lifting, house/yardwork, driving/computer work    Modalities:      [] GR/ESU 15 min    [] GR 15 min   [] ESU     [x] CP x 15 min    [] MHP    [] declined    Charges:   TIME IN: 9:15 am        TIME OUT:    10:20 am    Timed Code Treatment Minutes: 40   Total Treatment Minutes: 65     [] EVAL (LOW) 13970 (typically 20 minutes face-to-face)    [] EVAL (MOD) 40372 (typically 30 minutes face-to-face)  [] EVAL (HIGH) 38123 (typically 45 minutes face-to-face)  [] RE-EVAL     [x] KF(94955) x  1   9:15-9:30 [] IONTO   [x] NMR (26003) x  1    9:35-9:50       []   [x] Manual (31273) x  1  9:55-10:05    [] Other:    [] TA x       [] Mech Traction (00030)  [] ES(attended) (38869)      [] ES (un) (35902):     GOALS:  Patient stated goal: Able to use my arm. Therapist goals for Patient:   Short Term Goals: To be achieved in: 2 weeks  1. Independent in HEP and progression per patient tolerance, in order to prevent re-injury. -MET  2. Patient will have a decrease in pain to facilitate improvement in movement, function, and ADLs as indicated by Functional Deficits. -PROGRESSING    Long Term Goals: To be achieved in: 12 weeks  1. Disability index score of 35% or less for the [facility] to assist with reaching prior level of function. -PROGRESSING  2. Patient will demonstrate increased AROM to 150 flex, 90 ER, and 70 IR to allow for proper joint functioning as indicated by patients Functional Deficits. -PROGRESSING  3. Patient will demonstrate an increase in Strength to 4+/ 5 throughout left UE to allow for proper functional mobility as indicated by patients Functional Deficits. -PROGRESSING  4. Patient will return to dressing, driving, and maintain home without increased symptoms or restriction. -PARTIALLY MET  5. Participate in work conditioning/ work hardening for safe return to work force.    -WORKING TOWARDS  GOALS UPDATED 11/12/19    New or Updated Goals (if applicable):  [x] No change to goals established upon initial eval/last progress note:  New Goals:    Progression Towards Functional goals:   [] Patient is progressing as expected towards functional goals listed. [x] Progression is slowed due to complexities listed. 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 [] Plan just implemented, too soon to assess goals progression  [] Other:     ASSESSMENT:   He complains of popping in his left shoulder when doing supine ER stretch. This persisted even when trying to reposition and use a thicker bolster under his arm. Still has some shakiness in his arms with punch and OH with the 3# DB, but doesn't seem as pronounced as it used to be. Also complains of numbness in his hand when he stretches to the extremes with a straight elbow. Treatment/Activity Tolerance:  [] Patient tolerated treatment well [x] Patient limited by fatique  [x] Patient limited by pain  [] Patient limited by other medical complications  [] Other:     Prognosis: [] Good [x] Fair  [] Poor    Patient Requires Follow-up: [x] Yes  [] No    PLAN: He will be continuing PT here at this office 2x/week.      Continue PT pending further visit approval.   Recommend 2x/week for another 6 weeks (12 visits)  [x] Continue per plan of care [] Alter current plan (see comments)  [] Plan of care initiated [] Hold pending MD visit [] Discharge    Electronically signed by     Stephanie Briones [address] #[id number]

## 2020-02-04 ENCOUNTER — TREATMENT (OUTPATIENT)
Dept: PHYSICAL THERAPY | Age: 55
End: 2020-02-04
Payer: COMMERCIAL

## 2020-02-04 PROCEDURE — 97110 THERAPEUTIC EXERCISES: CPT | Performed by: PHYSICAL THERAPIST

## 2020-02-04 PROCEDURE — 97140 MANUAL THERAPY 1/> REGIONS: CPT | Performed by: PHYSICAL THERAPIST

## 2020-02-04 PROCEDURE — 97112 NEUROMUSCULAR REEDUCATION: CPT | Performed by: PHYSICAL THERAPIST

## 2020-02-04 NOTE — FLOWSHEET NOTE
[facility]   Phone: [phone]    Fax: [phone]        Physical Therapy Daily Treatment Note  Date:  2020    Patient Name:  Rina Garcia    :  1965  MRN: <[id number]>  Restrictions/Precautions:    Physician Information:  Referring Practitioner: Dr. Carla Green  Medical/Treatment Diagnosis Information:  · Diagnosis: M75.22 (ICD-10-CM) - Bicipital tendinitis, left shoulder   s/p LEFT SHOULDER   extensive debridement of labrum and rotator interval, arthroscopic lysis of subdeltoid subacromial adhesions, partial synovectomy, open subpectoral biceps tenodesis  DOS:  19  · Treatment Diagnosis:left shoulder pain  M25.512  [] Conservative / [x] Surgical - DOS: 19  Therapy Diagnosis/Practice Pattern:  Practice Pattern I: Bony or Soft Tissue Surgery  Insurance/Certification information:  PT Insurance Information: [facility]  Plan of care signed: [] YES  [] NO  Number of Comorbidities:  []0     [x]1-2    []3+    Date of Patient follow up with Physician:  2020     Christopher Price  93  %  19    Progress Note: []  Yes  []  No  Next due by: Visit #10        Latex Allergy:  [x]NO      []YES  Preferred Language for Healthcare:   [x]English       []other:    Visit # Insurance Allowable Reporting Period   10 12   Begin Date:                End Date:  2020       SUBJECTIVE:   5+ months post op  He states his back was still sore over the weekend, but it is now feeling better. OBJECTIVE:   Observation:   Palpation:   Pt is having difficulty controlling mm guarding. Pt does not have firm end feel.  Tightness noted in pec, subscapularis and levator w/ min tenderness;        ROM PROM-supine AROM-standing  Comment    L R L R 19   Flexion 160   135 165    Abduction 145       ER @ 45 70        BB/IR   L2 T9    OH/ER   Mid CV T4    Other             Strength:   Deltoid  IR  ER L  4/5*  4+/5*  4/5* R  5/5  5/5  5/5 Comment  19    *denotes pain with activity       Special Tests Results/Comment: deferred secondary to surgery        RESTRICTIONS/PRECAUTIONS: cervical fusion, HBP,  2 left shoulder surgeries (biceps tenodesis most recent)         Exercises/Interventions:   Therapeutic Ex (79883) Sets/sec Reps Notes/CUES   AD UE BIKE            STRETCHING      Pulleys 5min     Wand flexion 5sec 15    Wand ER supine at 45/90 5sec 15 each    UE [place]      Pendulum      Doorway      Wall Slides 5 sec  10       TP BB 10sec 10 Pulling across to the side  Then pulling up x5      SDLY Sleeper 10sec 10                ISOMETRICS      Gripping      Retraction      Abduction 5sec 10 Ball on wall   Flexion      Internal Rotation      External Rotation            STRENGTHENING-PREs      Flexion 3 10 Supine bilat punch and OH 3# in hand   Abduction      Internal Rotation      External Rotation-sidelying 3 10 2-3#   Shrugs      Biceps 3 10 5#   Triceps      Retraction      Extension      Horizontal Abduction in ER      Serratus 3 10 3#                     THERABANDS/CABLE COLUMN      Rows 3 10 matias   Lats 3 10 Green   Extension      Internal Rotation 3 10 purple   External Rotation 3 10 purple   Biceps      Triceps      PNF                                    Manual Intervention (24006)      Scar Massage      STM      Hawkgrips      GH joint mobilizations  x5' Long distraction/oscillations  Grade 1-2   Manual PROM all motions  x10'                NMR re-education (12213)   CUES NEEDED   Plyoback      Therabar oscillations      Body Blade       Rhythmic Stabilization      Supine AROM x30 all  Fw/bw, sd/sd, cw/ccw  2# weight in hand   Ball on the wall Cw/ccw x30   4# plyoball                   Therapeutic Activity (34186)      Core training      Swiss ball activities      Education                          Therapeutic Exercise and NMR EXR  [x] (49687) Provided verbal/tactile cueing for activities related to strengthening, flexibility, endurance, ROM  for improvements in scapular, scapulothoracic and UE control with self care, reaching, carrying, lifting, house/yardwork, driving/computer work. [x] (17695) Provided verbal/tactile cueing for activities related to improving balance, coordination, kinesthetic sense, posture, motor skill, proprioception  to assist with  scapular, scapulothoracic and UE control with self care, reaching, carrying, lifting, house/yardwork, driving/computer work. Therapeutic Activities:    [x] (11431 or 11262) Provided verbal/tactile cueing for activities related to improving balance, coordination, kinesthetic sense, posture, motor skill, proprioception and motor activation to allow for proper function of scapular, scapulothoracic and UE control with self care, carrying, lifting, driving/computer work.      Home Exercise Program:    [x] (08563) Reviewed/Progressed HEP activities related to strengthening, flexibility, endurance, ROM of scapular, scapulothoracic and UE control with self care, reaching, carrying, lifting, house/yardwork, driving/computer work  [] (10411) Reviewed/Progressed HEP activities related to improving balance, coordination, kinesthetic sense, posture, motor skill, proprioception of scapular, scapulothoracic and UE control with self care, reaching, carrying, lifting, house/yardwork, driving/computer work      Manual Treatments:  PROM / STM / Oscillations-Mobs:  G-I, II, III, IV (PA's, Inf., Post.)  [x] (88815) Provided manual therapy to mobilize soft tissue/joints of cervical/CT, scapular GHJ and UE for the purpose of modulating pain, promoting relaxation,  increasing ROM, reducing/eliminating soft tissue swelling/inflammation/restriction, improving soft tissue extensibility and allowing for proper ROM for normal function with self care, reaching, carrying, lifting, house/yardwork, driving/computer work    Modalities:      [] GR/ESU 15 min    [] GR 15 min   [] ESU     [x] CP x 15 min    [] MHP    [] declined    Charges:   TIME IN: 9:00 am        TIME OUT:    10:05 am    Timed Code Treatment Minutes: 40   Total Treatment Minutes: 65     [] EVAL (LOW) 57320 (typically 20 minutes face-to-face)    [] EVAL (MOD) 95526 (typically 30 minutes face-to-face)  [] EVAL (HIGH) 70410 (typically 45 minutes face-to-face)  [] RE-EVAL     [x] BX(83398) x  1   9:00-9:15 [] IONTO   [x] NMR (98788) x  1    9:20-9:35       []   [x] Manual (99100) x  1  9:40-9:50    [] Other:    [] TA x       [] Mech Traction (56738)  [] ES(attended) (52893)      [] ES (un) (70523):     GOALS:  Patient stated goal: Able to use my arm. Therapist goals for Patient:   Short Term Goals: To be achieved in: 2 weeks  1. Independent in HEP and progression per patient tolerance, in order to prevent re-injury. -MET  2. Patient will have a decrease in pain to facilitate improvement in movement, function, and ADLs as indicated by Functional Deficits. -PROGRESSING    Long Term Goals: To be achieved in: 12 weeks  1. Disability index score of 35% or less for the [facility] to assist with reaching prior level of function. -PROGRESSING  2. Patient will demonstrate increased AROM to 150 flex, 90 ER, and 70 IR to allow for proper joint functioning as indicated by patients Functional Deficits. -PROGRESSING  3. Patient will demonstrate an increase in Strength to 4+/ 5 throughout left UE to allow for proper functional mobility as indicated by patients Functional Deficits. -PROGRESSING  4. Patient will return to dressing, driving, and maintain home without increased symptoms or restriction. -PARTIALLY MET  5. Participate in work conditioning/ work hardening for safe return to work force. -WORKING TOWARDS  GOALS UPDATED 11/12/19    New or Updated Goals (if applicable):  [x] No change to goals established upon initial eval/last progress note:  New Goals:    Progression Towards Functional goals:   [] Patient is progressing as expected towards functional goals listed.     [x] Progression is slowed due to complexities listed. 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 [] Plan just implemented, too soon to assess goals progression  [] Other:     ASSESSMENT:   He works hard through his routine. No increase in pain reported. He continues to feel most burning and fatigued with resisted external rotation exercises. When doing 3# on his side for ER, he could only do 5 reps at a time so we went back to the 2# db. Treatment/Activity Tolerance:  [] Patient tolerated treatment well [x] Patient limited by fatique  [x] Patient limited by pain  [] Patient limited by other medical complications  [] Other:     Prognosis: [] Good [x] Fair  [] Poor    Patient Requires Follow-up: [x] Yes  [] No    PLAN: He will be continuing PT here at this office 2x/week.      Continue PT pending further visit approval.   Recommend 2x/week for another 6 weeks (12 visits)  [x] Continue per plan of care [] Alter current plan (see comments)  [] Plan of care initiated [] Hold pending MD visit [] Discharge    Electronically signed by     Silvestre Deluca, [address] #[id number]

## 2020-02-06 ENCOUNTER — TREATMENT (OUTPATIENT)
Dept: PHYSICAL THERAPY | Age: 55
End: 2020-02-06
Payer: COMMERCIAL

## 2020-02-06 PROCEDURE — 97112 NEUROMUSCULAR REEDUCATION: CPT | Performed by: PHYSICAL THERAPIST

## 2020-02-06 PROCEDURE — 97110 THERAPEUTIC EXERCISES: CPT | Performed by: PHYSICAL THERAPIST

## 2020-02-06 PROCEDURE — 97140 MANUAL THERAPY 1/> REGIONS: CPT | Performed by: PHYSICAL THERAPIST

## 2020-02-06 NOTE — FLOWSHEET NOTE
restriction. -MOSTLY MET  5. Participate in work conditioning/ work hardening for safe return to work force. -WORKING TOWARDS/PROGRESSING  GOALS UPDATED 11/12/19  GOALS UPDATED 2/6/2020      Progression Towards Functional goals:   [] Patient is progressing as expected towards functional goals listed. [x] Progression is slowed due to complexities listed. 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 [] Plan just implemented, too soon to assess goals progression  [] Other:     ASSESSMENT:   Updated objective measurements taken today. He has been compliant with attending PT at this office. He does realize his left UE may never be the same but he is motivated to continue to improve and maximize his functional outcome. His quick dash score improved from 93% disability to 61%. His functional reach has improved. His strength is slightly better in some areas. Still has pain with resistance. His posterior rotator cuff muscles are the weakest and tend to demonstrate poor endurance. He continues to put forth good effort in therapy and does everything asked of him. Skilled rehab is still deemed beneficial and necessary at this time as he is continuing to make improvements. We knew this would be a very slow recovery process due to second surgery and co morbidities of his CV issues. Treatment/Activity Tolerance:  [] Patient tolerated treatment well [x] Patient limited by fatique  [x] Patient limited by pain  [] Patient limited by other medical complications  [] Other:     Prognosis: [] Good [x] Fair  [] Poor    Patient Requires Follow-up: [x] Yes  [] No    PLAN: He will be continuing PT here at this office 2x/week. Continue PT pending further visit approval.   Recommend 2x/week for another 6 weeks (12 visits)  Next week, increase weights/resistance as able.   [x] Continue per plan of care [] Alter current plan (see comments)  [] Plan of care initiated [] Hold pending MD visit [] Discharge    Electronically signed by     Stephanie Briones, [address] #[id number]

## 2020-02-11 ENCOUNTER — TREATMENT (OUTPATIENT)
Dept: PHYSICAL THERAPY | Age: 55
End: 2020-02-11

## 2020-02-11 NOTE — FLOWSHEET NOTE
[facility]   Phone: [phone]    Fax: [phone]      Physical Therapy  Cancellation/No-show Note  Patient Name:  Yuko Terry  :  1965   Date:  2020    Cancelled visits to date: 3  No-shows to date: 0    For today's appointment patient:  [x]  Cancelled  []  Rescheduled appointment  []  No-show     Reason given by patient:  []  Patient ill  []  Conflicting appointment  []  No transportation    []  Conflict with work  []  No reason given  [x]  Other:     Comments:  Patient called stating he has to cancel due to having a sick child at home    Phone call information:   []  Phone call made today to patient at _ time at number provided:      []  Patient answered, conversation as follows:    []  Patient did not answer, message left as follows:  []  Phone call not made today  [x]  Phone call not needed - pt contacted us to cancel and provided reason for cancellation.      Electronically signed by:      Lv Petersen #[id number]

## 2020-02-13 ENCOUNTER — TREATMENT (OUTPATIENT)
Dept: PHYSICAL THERAPY | Age: 55
End: 2020-02-13
Payer: COMMERCIAL

## 2020-02-13 PROCEDURE — 97110 THERAPEUTIC EXERCISES: CPT | Performed by: PHYSICAL THERAPIST

## 2020-02-13 PROCEDURE — 97140 MANUAL THERAPY 1/> REGIONS: CPT | Performed by: PHYSICAL THERAPIST

## 2020-02-13 PROCEDURE — 97112 NEUROMUSCULAR REEDUCATION: CPT | Performed by: PHYSICAL THERAPIST

## 2020-02-13 NOTE — FLOWSHEET NOTE
[facility]   Phone: [phone]    Fax: [phone]        Physical Therapy Daily Treatment Note  Date:  2020    Patient Name:  Silas Valero    :  1965  MRN: <[id number]>  Restrictions/Precautions:    Physician Information:  Referring Practitioner: Dr. Monique Givens  Medical/Treatment Diagnosis Information:  · Diagnosis: M75.22 (ICD-10-CM) - Bicipital tendinitis, left shoulder   s/p LEFT SHOULDER   extensive debridement of labrum and rotator interval, arthroscopic lysis of subdeltoid subacromial adhesions, partial synovectomy, open subpectoral biceps tenodesis  DOS:  19  · Treatment Diagnosis:left shoulder pain  M25.512  [] Conservative / [x] Surgical - DOS: 19  Therapy Diagnosis/Practice Pattern:  Practice Pattern I: Bony or Soft Tissue Surgery  Insurance/Certification information:  PT Insurance Information: [facility]  Plan of care signed: [] YES  [] NO  Number of Comorbidities:  []0     [x]1-2    []3+    Date of Patient follow up with Physician:  2020     Kyra Coraes  19    93% disability  Quick Dash  2020     61% disability      Progress Note: []  Yes  []  No  Next due by: Visit #10        Latex Allergy:  [x]NO      []YES  Preferred Language for Healthcare:   [x]English       []other:    Visit # Insurance Allowable Reporting Period   12 12   Begin Date:                End Date:  2020       SUBJECTIVE:   5+ months post op  No new reports with his shoulder. Still has soreness to report. He states he has been dealing with sick kids at home and trying to stay healthy himself.             OBJECTIVE:        ROM PROM-supine AROM-standing  Comment    L R L R 2020   Flexion 165  170 145 165    Abduction 160 180 130 165    ER @ 90 80 110       BB/IR   T11-12 T8    OH/ER   T1-2 T4    Other             Strength:   Deltoid  IR  ER L  4+/5*  5-/5*  4/5* R  5/5  5/5  5/5 Comment  2020    *denotes pain with resistance       Special Tests Results/Comment: deferred secondary to surgery        RESTRICTIONS/PRECAUTIONS: cervical fusion, HBP,  2 left shoulder surgeries (biceps tenodesis most recent)         Exercises/Interventions:   Therapeutic Ex (84905) Sets/sec Reps Notes/CUES   AD UE BIKE            STRETCHING      Pulleys 5min     Wand flexion 5sec 15    Wand ER supine at 45/90 5sec 15 each    UE [place]      Pendulum      Doorway      Wall Slides 5 sec  10       TP BB 10sec 10 Pulling across to the side  Then pulling up x5      SDLY Sleeper 10sec 10                ISOMETRICS      Gripping      Retraction      Abduction 5sec 10 Ball on wall   Flexion      Internal Rotation      External Rotation            STRENGTHENING-PREs      Flexion 3 10 Supine bilat punch and OH 3# in hand   Abduction      Internal Rotation      External Rotation-sidelying 3 10 2#   Shrugs      Biceps 3 10 5#   Triceps      Retraction      Extension      Horizontal Abduction in ER      Serratus 3 10 4#                     THERABANDS/CABLE COLUMN      Rows 3 10 purple   Lats 3 10 Green   Extension      Internal Rotation 3 10 purple   External Rotation 3 10 purple   Biceps      Triceps      PNF                                    Manual Intervention (89173)      Scar Massage      STM      Hawkgrips      GH joint mobilizations  x5' Long distraction/oscillations  Grade 1-2   Manual PROM all motions  x10'                NMR re-education (79938)   CUES NEEDED   Plyoback      Therabar oscillations      Body Blade       Rhythmic Stabilization      Supine AROM x30 all  Fw/bw, sd/sd, cw/ccw  3# weight in hand   Ball on the wall Up/down, sd/sd, Cw/ccw x30   4# plyoball                   Therapeutic Activity (13021)      Core training      Swiss ball activities      Education                          Therapeutic Exercise and NMR EXR  [x] (01372) Provided verbal/tactile cueing for activities related to strengthening, flexibility, endurance, ROM  for improvements in scapular, scapulothoracic and UE control with self care, reaching, carrying, lifting, house/yardwork, driving/computer work. [x] (81397) Provided verbal/tactile cueing for activities related to improving balance, coordination, kinesthetic sense, posture, motor skill, proprioception  to assist with  scapular, scapulothoracic and UE control with self care, reaching, carrying, lifting, house/yardwork, driving/computer work. Therapeutic Activities:    [x] (67337 or 50855) Provided verbal/tactile cueing for activities related to improving balance, coordination, kinesthetic sense, posture, motor skill, proprioception and motor activation to allow for proper function of scapular, scapulothoracic and UE control with self care, carrying, lifting, driving/computer work.      Home Exercise Program:    [x] (41874) Reviewed/Progressed HEP activities related to strengthening, flexibility, endurance, ROM of scapular, scapulothoracic and UE control with self care, reaching, carrying, lifting, house/yardwork, driving/computer work  [] (49330) Reviewed/Progressed HEP activities related to improving balance, coordination, kinesthetic sense, posture, motor skill, proprioception of scapular, scapulothoracic and UE control with self care, reaching, carrying, lifting, house/yardwork, driving/computer work      Manual Treatments:  PROM / STM / Oscillations-Mobs:  G-I, II, III, IV (PA's, Inf., Post.)  [x] (54205) Provided manual therapy to mobilize soft tissue/joints of cervical/CT, scapular GHJ and UE for the purpose of modulating pain, promoting relaxation,  increasing ROM, reducing/eliminating soft tissue swelling/inflammation/restriction, improving soft tissue extensibility and allowing for proper ROM for normal function with self care, reaching, carrying, lifting, house/yardwork, driving/computer work    Modalities:      [] GR/ESU 15 min    [] GR 15 min   [] ESU     [x] CP x 15 min    [] MHP    [] declined    Charges:   TIME IN: 9:15 am TIME OUT:    10:25 am    Timed Code Treatment Minutes: 45   Total Treatment Minutes: 70     [] EVAL (LOW) 24718 (typically 20 minutes face-to-face)    [] EVAL (MOD) 71259 (typically 30 minutes face-to-face)  [] EVAL (HIGH) 98779 (typically 45 minutes face-to-face)  [] RE-EVAL     [x] MK(32143) x  1   9:15-9:30 [] IONTO   [x] NMR (18251) x  1    9:35-9:45       []   [x] Manual (83930) x  1  9:55-10:05    [] Other:    [] TA x       [] Mech Traction (39276)  [] ES(attended) (22120)      [] ES (un) (00780):     GOALS:  Patient stated goal: Able to use my arm. Therapist goals for Patient:   Short Term Goals: To be achieved in: 2 weeks  1. Independent in HEP and progression per patient tolerance, in order to prevent re-injury. -MET  2. Patient will have a decrease in pain to facilitate improvement in movement, function, and ADLs as indicated by Functional Deficits. -PROGRESSING    Long Term Goals: To be achieved in: 12 weeks  1. Disability index score of 35% or less for the [facility] to assist with reaching prior level of function. -PROGRESSING  2. Patient will demonstrate increased AROM to 150 flex, 90 ER, and 70 IR to allow for proper joint functioning as indicated by patients Functional Deficits. -PROGRESSING  3. Patient will demonstrate an increase in Strength to 4+/ 5 throughout left UE to allow for proper functional mobility as indicated by patients Functional Deficits. -PROGRESSING  4. Patient will return to dressing, driving, and maintain home without increased symptoms or restriction. -MOSTLY MET  5. Participate in work conditioning/ work hardening for safe return to work force. -WORKING TOWARDS/PROGRESSING  GOALS UPDATED 11/12/19  GOALS UPDATED 2/6/2020      Progression Towards Functional goals:   [] Patient is progressing as expected towards functional goals listed. [x] Progression is slowed due to complexities listed.  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 [] Plan just implemented, too soon to assess goals progression  [] Other:     ASSESSMENT:   Very shaky infraspinatus contraction palpated with sidelying ER. This remains one of the most difficult exercises for him due to weakness and poor endurance in that area of his shoulder complex. Overall, his AROM in supine looks good. Main limitation is decreased strength. He complained of some increase in pain with supine AROM with 3# in hand during the circles. Treatment/Activity Tolerance:  [] Patient tolerated treatment well [x] Patient limited by fatique  [x] Patient limited by pain  [] Patient limited by other medical complications  [] Other:     Prognosis: [] Good [x] Fair  [] Poor    Patient Requires Follow-up: [x] Yes  [] No    PLAN: He will be continuing PT here at this office 2x/week. Continue PT pending further visit approval.   Recommend 2x/week for another 6 weeks (12 visits)  Awaiting further approval to continue therapy. Next week, increase weights/resistance as able.   [x] Continue per plan of care [] Alter current plan (see comments)  [] Plan of care initiated [] Hold pending MD visit [] Discharge    Electronically signed by     Paz Brown, [address] #[id number]

## 2020-02-18 ENCOUNTER — TREATMENT (OUTPATIENT)
Dept: PHYSICAL THERAPY | Age: 55
End: 2020-02-18

## 2020-02-20 ENCOUNTER — OFFICE VISIT (OUTPATIENT)
Dept: ORTHOPEDIC SURGERY | Age: 55
End: 2020-02-20
Payer: COMMERCIAL

## 2020-02-20 VITALS
HEART RATE: 94 BPM | SYSTOLIC BLOOD PRESSURE: 154 MMHG | DIASTOLIC BLOOD PRESSURE: 112 MMHG | WEIGHT: 205.03 LBS | BODY MASS INDEX: 27.77 KG/M2 | HEIGHT: 72 IN

## 2020-02-20 PROCEDURE — 99213 OFFICE O/P EST LOW 20 MIN: CPT | Performed by: ORTHOPAEDIC SURGERY

## 2020-02-20 NOTE — PROGRESS NOTES
Chief Complaint    Shoulder Pain (WC f/u left shoulder)      History of Present Illness:  Aleisha Tse is a pleasant, 48 y.o., male, here today for follow up of his left shoulder arthroscopic extensive debridement and open subpectoral tenodesis 8/16/2019. Workman's compensation case. He is doing better today with respect to pain levels and regaining subjective motion in his shoulder. He still feels that catching pain/discomfort coming down from an overhead forward flexed position. This is associated with a mild sense of instability. He has no pain at night. Of note, he has chronic ipsilateral radiculopathy from primary and revision C6/7 fusion surgery. He reports no new injuries or setbacks. Medical History:  Patient's medications, allergies, past medical, surgical, social and family histories were reviewed and updated as appropriate. Patient has had no medical changes since last evaluated      Review of Systems  A 14 point review of systems was completed by the patient on 4/30/2019 and is available in the media section of the scanned medical record and was reviewed on 2/20/2020. The review is negative with the exception of those things mentioned in the HPI and Past Medical History    Vital Signs:  Vitals:    02/20/20 0948   BP: (!) 154/112   Pulse: 94       General/Appearance: Alert and oriented and in no apparent distress. Skin:  There are no skin lesions, cellulitis, or extreme edema. The patient has warm and well-perfused Bilateral upper extremities with brisk capillary refill. LEFT Shoulder Exam:  Inspection:  No gross deformities, no signs of infection. Palpation: Tenderness over biceps tendon mildly. Active Range of Motion: Forward Elevation 150, Abduction 150, External Rotation 50, Internal Rotation T11    Passive Range of Motion:  Forward Elevation 160, Abduction 160, External Rotation 50, Internal Rotation T12    Strength:  External Rotation 4/5, Internal Rotation 5/5, Supraspinatus 4+/5, Champagne Toast 4+/5    Special Tests:  No Darío muscle deformity. Neurovascular: Sensation to light touch is intact, no motor deficits, palpable radial pulses 2+    Radiology:     No new XR obtained at this time. Assessment :  Mr. Ernst Rodriguez is a pleasant, 48 y.o. patient who is doing well shoulder arthroscopic extensive debridement and open subpectoral tenodesis 8/16/2019. He has permanent work restrictions from cervical spine surgery. His shoulder has improved significantly, but would benefit from further focused shoulder therapy before returning to a work conditioning program.      Impression:  Encounter Diagnoses   Name Primary?  S/P arthroscopy of left shoulder Yes    Left shoulder pain, unspecified chronicity     Biceps tendonitis on left        Office Procedures:  Orders Placed This Encounter   Procedures    Ambulatory referral to Physical Therapy     Referral Priority:   Routine     Referral Type:   Eval and Treat     Referral Reason:   Specialty Services Required     Requested Specialty:   Physical Therapy     Number of Visits Requested:   1       Treatment Plan: We recommend that he continue in physical therapy at [facility] office for another 1-2x/week for 1 month with focus on regaining gleno-humeral and scapulo-humeral rythm. A new physical therapy letter was documented in EPIC today. We will see Jay Jay back in 4 weeks and/or as needed. All questions were answered to patient's satisfaction and He was encouraged to call with any further questions or concerns. Wilner Mack is in agreement with this plan. Sincerely,    Estiven Ricci  [address]   Email: [email or web address]  Cell: [phone]    02/20/20  10:29 AM     The encounter with Ernst Rodriguez was supervised by Dr Emily Cordero who personally examined the patient and reviewed the plan.       This dictation was performed with a verbal recognition program (DRAGON) and it was checked for errors. It is possible that there are still dictated errors within this office note. If so, please bring any errors to my attention for an addendum. All efforts were made to ensure that this office note is accurate.   ____________  I was physically present and personally supervised the Orthopaedic Sports Medicine Fellow in the evaluation and development of a treatment plan for this patient. I personally interviewed the patient and performed a physical examination. In addition, I discussed the patient's condition and treatment options with them. I have also reviewed and agree with the past medical, family and social history unless otherwise noted. All of the patient's questions were answered. Jojo Payton MD, PhD  2/20/2020

## 2020-03-03 ENCOUNTER — TREATMENT (OUTPATIENT)
Dept: PHYSICAL THERAPY | Age: 55
End: 2020-03-03
Payer: COMMERCIAL

## 2020-03-03 PROCEDURE — 97112 NEUROMUSCULAR REEDUCATION: CPT | Performed by: PHYSICAL THERAPIST

## 2020-03-03 PROCEDURE — 97140 MANUAL THERAPY 1/> REGIONS: CPT | Performed by: PHYSICAL THERAPIST

## 2020-03-03 PROCEDURE — 97110 THERAPEUTIC EXERCISES: CPT | Performed by: PHYSICAL THERAPIST

## 2020-03-03 NOTE — PROGRESS NOTES
[facility]   Phone: [phone]    Fax: [phone]        Physical Therapy Daily Treatment Note  Date:  3/3/2020    Patient Name:  Silas Valero    :  1965  MRN: <[id number]>  Restrictions/Precautions:    Physician Information:  Referring Practitioner: Dr. Monique Givens  Medical/Treatment Diagnosis Information:  · Diagnosis: M75.22 (ICD-10-CM) - Bicipital tendinitis, left shoulder   s/p LEFT SHOULDER   extensive debridement of labrum and rotator interval, arthroscopic lysis of subdeltoid subacromial adhesions, partial synovectomy, open subpectoral biceps tenodesis  DOS:  19  · Treatment Diagnosis:left shoulder pain  M25.512  [] Conservative / [x] Surgical - DOS: 19  Therapy Diagnosis/Practice Pattern:  Practice Pattern I: Bony or Soft Tissue Surgery  Insurance/Certification information:  PT Insurance Information: [facility]  Plan of care signed: [] YES  [] NO  Number of Comorbidities:  []0     [x]1-2    []3+    Date of Patient follow up with Physician:  3/31/2020     Kyra Coreas  19    93% disability  Quick Dash  2020     61% disability      Progress Note: []  Yes  []  No  Next due by: Visit #10        Latex Allergy:  [x]NO      []YES  Preferred Language for Healthcare:   [x]English       []other:    Visit # Insurance Allowable Reporting Period   1 8  Begin Date:                End Date:  3/31/2020       SUBJECTIVE:   6+ months post op  Patient stated that he has a cold today. Other than that, he stated that he is feeling 'okay' in regards to the shoulder. Reports that this will most likely be the last round of therapy that is approved. Probably heading to a Work Conditioning/Hardening program next.            OBJECTIVE:        ROM PROM-supine AROM-standing  Comment    L R L R 2020   Flexion 165  170 145 165    Abduction 160 180 130 165    ER @ 90 80 110       BB/IR   T11-12 T8    OH/ER   T1-2 T4    Other             Strength: Deltoid  IR  ER L  4+/5*  5-/5*  4/5* R  5/5  5/5  5/5 Comment  2/6/2020    *denotes pain with resistance       Special Tests Results/Comment: deferred secondary to surgery        RESTRICTIONS/PRECAUTIONS: cervical fusion, HBP,  2 left shoulder surgeries (biceps tenodesis most recent)         Exercises/Interventions:   Therapeutic Ex (49110) Sets/sec Reps Notes/CUES   AD UE BIKE            STRETCHING      Pulleys 5min     Wand flexion 5sec 15    Wand ER supine at 45/90 5sec 15 each    UE [place]      Pendulum      Doorway      Wall Slides 5 sec  10       TP BB 10sec 10 Pulling across to the side  Then pulling up x5      SDLY Sleeper 10sec 10                ISOMETRICS      Gripping      Retraction      Abduction 5sec 10 Ball on wall   Flexion      Internal Rotation      External Rotation            STRENGTHENING-PREs      Flexion 3 10 Supine bilat punch and OH 3# in hand   Abduction      Internal Rotation      External Rotation-sidelying 3 10 2#   Shrugs      Biceps 3 10 5#   Triceps      Retraction      Extension      Horizontal Abduction in ER      Serratus 3 10 5#                     THERABANDS/CABLE COLUMN      Rows 3 10 Purple  Increase npv   Lats 3 10 Green   Extension      Internal Rotation 3 10 Purple  Increase npv   External Rotation 3 10 purple   Biceps      Triceps      PNF                                    Manual Intervention (21203)      Scar Massage      STM      Hawkgrips      GH joint mobilizations  x5' Long distraction/oscillations  Grade 1-2   Manual PROM all motions  x10'                NMR re-education (95911)   CUES NEEDED   Plyoback      Therabar oscillations      Body Blade       Rhythmic Stabilization      Supine AROM x30 all  Fw/bw, sd/sd, cw/ccw  3# weight in hand   Ball on the wall Up/down, sd/sd, Cw/ccw x30   4# plyoball                   Therapeutic Activity (84385)      Core training      Swiss Clickst activities      Education                          Therapeutic Exercise and NMR EXR  [x] (31091) Provided verbal/tactile cueing for activities related to strengthening, flexibility, endurance, ROM  for improvements in scapular, scapulothoracic and UE control with self care, reaching, carrying, lifting, house/yardwork, driving/computer work. [x] (90960) Provided verbal/tactile cueing for activities related to improving balance, coordination, kinesthetic sense, posture, motor skill, proprioception  to assist with  scapular, scapulothoracic and UE control with self care, reaching, carrying, lifting, house/yardwork, driving/computer work. Therapeutic Activities:    [x] (62190 or 59974) Provided verbal/tactile cueing for activities related to improving balance, coordination, kinesthetic sense, posture, motor skill, proprioception and motor activation to allow for proper function of scapular, scapulothoracic and UE control with self care, carrying, lifting, driving/computer work.      Home Exercise Program:    [x] (86702) Reviewed/Progressed HEP activities related to strengthening, flexibility, endurance, ROM of scapular, scapulothoracic and UE control with self care, reaching, carrying, lifting, house/yardwork, driving/computer work  [] (31489) Reviewed/Progressed HEP activities related to improving balance, coordination, kinesthetic sense, posture, motor skill, proprioception of scapular, scapulothoracic and UE control with self care, reaching, carrying, lifting, house/yardwork, driving/computer work      Manual Treatments:  PROM / STM / Oscillations-Mobs:  G-I, II, III, IV (PA's, Inf., Post.)  [x] (25997) Provided manual therapy to mobilize soft tissue/joints of cervical/CT, scapular GHJ and UE for the purpose of modulating pain, promoting relaxation,  increasing ROM, reducing/eliminating soft tissue swelling/inflammation/restriction, improving soft tissue extensibility and allowing for proper ROM for normal function with self care, reaching, carrying, lifting, house/yardwork, driving/computer work    Modalities:      [] GR/ESU 15 min    [] GR 15 min   [] ESU     [x] CP x 15 min    [] MHP    [] declined    Charges:   TIME IN: 9:15 am        TIME OUT:    10:20  am    Timed Code Treatment Minutes: 45   Total Treatment Minutes: 65     [] EVAL (LOW) 27680 (typically 20 minutes face-to-face)    [] EVAL (MOD) 69042 (typically 30 minutes face-to-face)  [] EVAL (HIGH) 34808 (typically 45 minutes face-to-face)  [] RE-EVAL     [x] SI(52231) x  1   9:15-9:30 [] IONTO   [x] NMR (02809) x  1    9:35-9:45       []   [x] Manual (18319) x  1  9:55-10:05    [] Other:    [] TA x       [] Mech Traction (67904)  [] ES(attended) (24605)      [] ES (un) (47451):     GOALS:  Patient stated goal: Able to use my arm. Therapist goals for Patient:   Short Term Goals: To be achieved in: 2 weeks  1. Independent in HEP and progression per patient tolerance, in order to prevent re-injury. -MET  2. Patient will have a decrease in pain to facilitate improvement in movement, function, and ADLs as indicated by Functional Deficits. -PROGRESSING    Long Term Goals: To be achieved in: 12 weeks  1. Disability index score of 35% or less for the [facility] to assist with reaching prior level of function. -PROGRESSING  2. Patient will demonstrate increased AROM to 150 flex, 90 ER, and 70 IR to allow for proper joint functioning as indicated by patients Functional Deficits. -PROGRESSING  3. Patient will demonstrate an increase in Strength to 4+/ 5 throughout left UE to allow for proper functional mobility as indicated by patients Functional Deficits. -PROGRESSING  4. Patient will return to dressing, driving, and maintain home without increased symptoms or restriction. -MOSTLY MET  5. Participate in work conditioning/ work hardening for safe return to work force.    -WORKING TOWARDS/PROGRESSING  GOALS UPDATED 11/12/19  GOALS UPDATED 2/6/2020      Progression Towards Functional goals:   [] Patient is progressing as expected

## 2020-03-05 ENCOUNTER — TREATMENT (OUTPATIENT)
Dept: PHYSICAL THERAPY | Age: 55
End: 2020-03-05
Payer: COMMERCIAL

## 2020-03-05 PROCEDURE — 97110 THERAPEUTIC EXERCISES: CPT | Performed by: PHYSICAL THERAPIST

## 2020-03-05 PROCEDURE — 97112 NEUROMUSCULAR REEDUCATION: CPT | Performed by: PHYSICAL THERAPIST

## 2020-03-05 PROCEDURE — 97140 MANUAL THERAPY 1/> REGIONS: CPT | Performed by: PHYSICAL THERAPIST

## 2020-03-05 NOTE — PROGRESS NOTES
[facility]   Phone: [phone]    Fax: [phone]        Physical Therapy Daily Treatment Note  Date:  3/5/2020    Patient Name:  Diane Cordova    :  1965  MRN: <[id number]>  Restrictions/Precautions:    Physician Information:  Referring Practitioner: Dr. Krishan Troncoso  Medical/Treatment Diagnosis Information:  · Diagnosis: M75.22 (ICD-10-CM) - Bicipital tendinitis, left shoulder   s/p LEFT SHOULDER   extensive debridement of labrum and rotator interval, arthroscopic lysis of subdeltoid subacromial adhesions, partial synovectomy, open subpectoral biceps tenodesis  DOS:  19  · Treatment Diagnosis:left shoulder pain  M25.512  [] Conservative / [x] Surgical - DOS: 19  Therapy Diagnosis/Practice Pattern:  Practice Pattern I: Bony or Soft Tissue Surgery  Insurance/Certification information:  PT Insurance Information: [facility]  Plan of care signed: [] YES  [] NO  Number of Comorbidities:  []0     [x]1-2    []3+    Date of Patient follow up with Physician:  3/31/2020     Radha Schneider  21    93% disability  Quick Dash  2020     61% disability      Progress Note: []  Yes  []  No  Next due by: Visit #10        Latex Allergy:  [x]NO      []YES  Preferred Language for Healthcare:   [x]English       []other:    Visit # Insurance Allowable Reporting Period   2 8  Begin Date:                End Date:  3/31/2020       SUBJECTIVE:   6+ months post op  He reports that he was sore yesterday. Definitely felt like he had a good workout here on Tuesday plus the fact he hasn't been here in over a week. Felt like muscle soreness.            OBJECTIVE:        ROM PROM-supine AROM-standing  Comment    L R L R 2020   Flexion 165  170 145 165    Abduction 160 180 130 165    ER @ 90 80 110       BB/IR   T11-12 T8    OH/ER   T1-2 T4    Other             Strength:   Deltoid  IR  ER L  4+/5*  5-/5*  4/5* R  5/5  5/5  5/5 Comment  2020    *denotes pain with resistance Special Tests Results/Comment: deferred secondary to surgery        RESTRICTIONS/PRECAUTIONS: cervical fusion, HBP,  2 left shoulder surgeries (biceps tenodesis most recent)         Exercises/Interventions:   Therapeutic Ex (66806) Sets/sec Reps Notes/CUES   AD UE BIKE            STRETCHING      Pulleys 5min     Wand flexion 5sec 15    Wand ER supine at 45/90 5sec 15 each    UE [place]      Pendulum      Doorway      Wall Slides 5 sec  10       TP BB 10sec 10 Pulling across to the side  Then pulling up x5      SDLY Sleeper 10sec 10                ISOMETRICS      Gripping      Retraction      Abduction 5sec 10 Ball on wall   Flexion      Internal Rotation      External Rotation            STRENGTHENING-PREs      Flexion 3 10 Supine bilat punch and OH 3# in hand   Abduction      Internal Rotation      External Rotation-sidelying 3 10 2#   Shrugs      Biceps 3 10 5#   Triceps      Retraction      Extension      Horizontal Abduction in ER      Serratus 3 10 5#                     THERABANDS/CABLE COLUMN      Rows 3 10 Purple  Increase npv   Lats 3 10 Black   Extension      Internal Rotation 3 10 Black     External Rotation 3 10 purple   Biceps      Triceps      PNF                                    Manual Intervention (68938)      Scar Massage      STM      Hawkgrips      GH joint mobilizations  x5' Long distraction/oscillations  Grade 1-2   Manual PROM all motions  x10'                NMR re-education (34412)   CUES NEEDED   Plyoback      Therabar oscillations      Body Blade       Rhythmic Stabilization      Supine AROM x30 all  Fw/bw, sd/sd, cw/ccw  3# weight in hand   Ball on the wall Up/down, sd/sd, Cw/ccw x30   4# plyoball                   Therapeutic Activity (91482)      Core training      Swiss iloho activities      Education                          Therapeutic Exercise and NMR EXR  [x] (94900) Provided verbal/tactile cueing for activities related to strengthening, flexibility, endurance, ROM  for improvements in scapular, scapulothoracic and UE control with self care, reaching, carrying, lifting, house/yardwork, driving/computer work. [x] (00654) Provided verbal/tactile cueing for activities related to improving balance, coordination, kinesthetic sense, posture, motor skill, proprioception  to assist with  scapular, scapulothoracic and UE control with self care, reaching, carrying, lifting, house/yardwork, driving/computer work. Therapeutic Activities:    [x] (01324 or 23030) Provided verbal/tactile cueing for activities related to improving balance, coordination, kinesthetic sense, posture, motor skill, proprioception and motor activation to allow for proper function of scapular, scapulothoracic and UE control with self care, carrying, lifting, driving/computer work.      Home Exercise Program:    [x] (39653) Reviewed/Progressed HEP activities related to strengthening, flexibility, endurance, ROM of scapular, scapulothoracic and UE control with self care, reaching, carrying, lifting, house/yardwork, driving/computer work  [] (23417) Reviewed/Progressed HEP activities related to improving balance, coordination, kinesthetic sense, posture, motor skill, proprioception of scapular, scapulothoracic and UE control with self care, reaching, carrying, lifting, house/yardwork, driving/computer work      Manual Treatments:  PROM / STM / Oscillations-Mobs:  G-I, II, III, IV (PA's, Inf., Post.)  [x] (89115) Provided manual therapy to mobilize soft tissue/joints of cervical/CT, scapular GHJ and UE for the purpose of modulating pain, promoting relaxation,  increasing ROM, reducing/eliminating soft tissue swelling/inflammation/restriction, improving soft tissue extensibility and allowing for proper ROM for normal function with self care, reaching, carrying, lifting, house/yardwork, driving/computer work    Modalities:      [] GR/ESU 15 min    [] GR 15 min   [] ESU     [x] CP x 15 min    [] MHP    [] declined    Charges:   TIME IN: 9:25 am        TIME OUT:    10:30  am    Timed Code Treatment Minutes: 45   Total Treatment Minutes: 65     [] EVAL (LOW) 18235 (typically 20 minutes face-to-face)    [] EVAL (MOD) 58364 (typically 30 minutes face-to-face)  [] EVAL (HIGH) 15661 (typically 45 minutes face-to-face)  [] RE-EVAL     [x] CT(64196) x  1   9:25-9:40 [] IONTO   [x] NMR (14176) x  1    9:45-9:55       []   [x] Manual (89157) x  1  10:00-10:10    [] Other:    [] TA x       [] Mech Traction (35850)  [] ES(attended) (60171)      [] ES (un) (48087):     GOALS:  Patient stated goal: Able to use my arm. Therapist goals for Patient:   Short Term Goals: To be achieved in: 2 weeks  1. Independent in HEP and progression per patient tolerance, in order to prevent re-injury. -MET  2. Patient will have a decrease in pain to facilitate improvement in movement, function, and ADLs as indicated by Functional Deficits. -PROGRESSING    Long Term Goals: To be achieved in: 12 weeks  1. Disability index score of 35% or less for the [facility] to assist with reaching prior level of function. -PROGRESSING  2. Patient will demonstrate increased AROM to 150 flex, 90 ER, and 70 IR to allow for proper joint functioning as indicated by patients Functional Deficits. -PROGRESSING  3. Patient will demonstrate an increase in Strength to 4+/ 5 throughout left UE to allow for proper functional mobility as indicated by patients Functional Deficits. -PROGRESSING  4. Patient will return to dressing, driving, and maintain home without increased symptoms or restriction. -MOSTLY MET  5. Participate in work conditioning/ work hardening for safe return to work force. -WORKING TOWARDS/PROGRESSING  GOALS UPDATED 11/12/19  GOALS UPDATED 2/6/2020      Progression Towards Functional goals:   [] Patient is progressing as expected towards functional goals listed. [x] Progression is slowed due to complexities listed.  (2nd shoulder surgery and

## 2020-03-10 ENCOUNTER — TREATMENT (OUTPATIENT)
Dept: PHYSICAL THERAPY | Age: 55
End: 2020-03-10
Payer: COMMERCIAL

## 2020-03-10 PROCEDURE — 97112 NEUROMUSCULAR REEDUCATION: CPT | Performed by: PHYSICAL THERAPIST

## 2020-03-10 PROCEDURE — 97110 THERAPEUTIC EXERCISES: CPT | Performed by: PHYSICAL THERAPIST

## 2020-03-10 PROCEDURE — 97140 MANUAL THERAPY 1/> REGIONS: CPT | Performed by: PHYSICAL THERAPIST

## 2020-03-10 NOTE — PROGRESS NOTES
[facility]   Phone: [phone]    Fax: [phone]        Physical Therapy Daily Treatment Note  Date:  3/10/2020    Patient Name:  Esau Alas    :  1965  MRN: <[id number]>  Restrictions/Precautions:    Physician Information:  Referring Practitioner: Dr. Jordan Marcus  Medical/Treatment Diagnosis Information:  · Diagnosis: M75.22 (ICD-10-CM) - Bicipital tendinitis, left shoulder   s/p LEFT SHOULDER   extensive debridement of labrum and rotator interval, arthroscopic lysis of subdeltoid subacromial adhesions, partial synovectomy, open subpectoral biceps tenodesis  DOS:  19  · Treatment Diagnosis:left shoulder pain  M25.512  [] Conservative / [x] Surgical - DOS: 19  Therapy Diagnosis/Practice Pattern:  Practice Pattern I: Bony or Soft Tissue Surgery  Insurance/Certification information:  PT Insurance Information: [facility]  Plan of care signed: [] YES  [] NO  Number of Comorbidities:  []0     [x]1-2    []3+    Date of Patient follow up with Physician:  3/31/2020     Johnice Phi  21    93% disability  Quick Dash  2020     61% disability      Progress Note: []  Yes  []  No  Next due by: Visit #10        Latex Allergy:  [x]NO      []YES  Preferred Language for Healthcare:   [x]English       []other:    Visit # Insurance Allowable Reporting Period   3 8  Begin Date:                End Date:  3/31/2020       SUBJECTIVE:   6+ months post op  He reports that his shoulder was sore the next day after his last session. He knows he has to push it in terms of gaining strength. He still does complain of some \"catching/popping\" when he lowers his arm down from an elevated position. He reports he is a little sore this morning, may have something to do with the rainy weather today.       OBJECTIVE:        ROM PROM-supine AROM-standing  Comment    L R L R 2020   Flexion 165  170 145 165    Abduction 160 180 130 165    ER @ 90 80 110       BB/IR function with self care, reaching, carrying, lifting, house/yardwork, driving/computer work    Modalities:      [] GR/ESU 15 min    [] GR 15 min   [] ESU     [x] CP x 15 min    [] MHP    [] declined    Charges:   TIME IN: 9:25 am        TIME OUT:   10:35 am    Timed Code Treatment Minutes: 45   Total Treatment Minutes: 70     [] EVAL (LOW) 13019 (typically 20 minutes face-to-face)    [] EVAL (MOD) 71102 (typically 30 minutes face-to-face)  [] EVAL (HIGH) 77133 (typically 45 minutes face-to-face)  [] RE-EVAL     [x] QE(56421) x  1   9:25-9:40 [] IONTO   [x] NMR (35638) x  1    9:45-10:00       []   [x] Manual (69675) x  1  10:05-10:15    [] Other:    [] TA x       [] Mech Traction (26309)  [] ES(attended) (93174)      [] ES (un) (83106):     GOALS:  Patient stated goal: Able to use my arm. Therapist goals for Patient:   Short Term Goals: To be achieved in: 2 weeks  1. Independent in HEP and progression per patient tolerance, in order to prevent re-injury. -MET  2. Patient will have a decrease in pain to facilitate improvement in movement, function, and ADLs as indicated by Functional Deficits. -PROGRESSING    Long Term Goals: To be achieved in: 12 weeks  1. Disability index score of 35% or less for the [facility] to assist with reaching prior level of function. -PROGRESSING  2. Patient will demonstrate increased AROM to 150 flex, 90 ER, and 70 IR to allow for proper joint functioning as indicated by patients Functional Deficits. -PROGRESSING  3. Patient will demonstrate an increase in Strength to 4+/ 5 throughout left UE to allow for proper functional mobility as indicated by patients Functional Deficits. -PROGRESSING  4. Patient will return to dressing, driving, and maintain home without increased symptoms or restriction. -MOSTLY MET  5. Participate in work conditioning/ work hardening for safe return to work force.    -WORKING TOWARDS/PROGRESSING  GOALS UPDATED 11/12/19  GOALS UPDATED 2/6/2020      Progression Towards Functional goals:   [] Patient is progressing as expected towards functional goals listed. [x] Progression is slowed due to complexities listed. 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 [] Plan just implemented, too soon to assess goals progression  [] Other:     ASSESSMENT:   He reports that when he has his arm fully extended into abduction, he feels nerve type of pain down his arm and that causes all of his fingers/hand to go numb with pins and needles. It slowly subsides once he gets out of that position. He really felt it when doing ball on the wall drills. He also feels a 'popping' in the anterior portion of the shoulder. He stated that he felt it most in supine flexion and all external rotation activities. Treatment/Activity Tolerance:  [] Patient tolerated treatment well [x] Patient limited by fatique  [x] Patient limited by pain  [] Patient limited by other medical complications  [] Other:     Prognosis: [] Good [x] Fair  [] Poor    Patient Requires Follow-up: [x] Yes  [] No    PLAN: He will be continuing PT here at this office 2x/week. Progress resistance program as much as tolerated over the next month.       [x] Continue per plan of care [] Alter current plan (see comments)  [] Plan of care initiated [] Hold pending MD visit [] Discharge    Electronically signed by     Paula Gunter, [address] #[id number]

## 2020-03-12 ENCOUNTER — TREATMENT (OUTPATIENT)
Dept: PHYSICAL THERAPY | Age: 55
End: 2020-03-12
Payer: COMMERCIAL

## 2020-03-12 PROCEDURE — 97110 THERAPEUTIC EXERCISES: CPT | Performed by: PHYSICAL THERAPIST

## 2020-03-12 PROCEDURE — 97112 NEUROMUSCULAR REEDUCATION: CPT | Performed by: PHYSICAL THERAPIST

## 2020-03-12 PROCEDURE — 97140 MANUAL THERAPY 1/> REGIONS: CPT | Performed by: PHYSICAL THERAPIST

## 2020-03-12 NOTE — PROGRESS NOTES
[x] CP x 15 min    [] MHP    [] declined    Charges:   TIME IN: 9:20 am        TIME OUT:   10:20 am    Timed Code Treatment Minutes: 45   Total Treatment Minutes: 60     [] EVAL (LOW) 04745 (typically 20 minutes face-to-face)    [] EVAL (MOD) 51273 (typically 30 minutes face-to-face)  [] EVAL (HIGH) 44241 (typically 45 minutes face-to-face)  [] RE-EVAL     [x] YD(73800) x  1   9:20-9:40 [] IONTO   [x] NMR (84821) x  1    9:45-9:55       []   [x] Manual (60717) x  1  9:55-10:05    [] Other:    [] TA x       [] Mech Traction (68227)  [] ES(attended) (04355)      [] ES (un) (17947):     GOALS:  Patient stated goal: Able to use my arm. Therapist goals for Patient:   Short Term Goals: To be achieved in: 2 weeks  1. Independent in HEP and progression per patient tolerance, in order to prevent re-injury. -MET  2. Patient will have a decrease in pain to facilitate improvement in movement, function, and ADLs as indicated by Functional Deficits. -PROGRESSING    Long Term Goals: To be achieved in: 12 weeks  1. Disability index score of 35% or less for the [facility] to assist with reaching prior level of function. -PROGRESSING  2. Patient will demonstrate increased AROM to 150 flex, 90 ER, and 70 IR to allow for proper joint functioning as indicated by patients Functional Deficits. -PROGRESSING  3. Patient will demonstrate an increase in Strength to 4+/ 5 throughout left UE to allow for proper functional mobility as indicated by patients Functional Deficits. -PROGRESSING  4. Patient will return to dressing, driving, and maintain home without increased symptoms or restriction. -MOSTLY MET  5. Participate in work conditioning/ work hardening for safe return to work force. -WORKING TOWARDS/PROGRESSING  GOALS UPDATED 11/12/19  GOALS UPDATED 2/6/2020      Progression Towards Functional goals:   [] Patient is progressing as expected towards functional goals listed. [x] Progression is slowed due to complexities listed.

## 2020-03-17 ENCOUNTER — TREATMENT (OUTPATIENT)
Dept: PHYSICAL THERAPY | Age: 55
End: 2020-03-17
Payer: COMMERCIAL

## 2020-03-17 PROCEDURE — 97112 NEUROMUSCULAR REEDUCATION: CPT | Performed by: PHYSICAL THERAPIST

## 2020-03-17 PROCEDURE — 97110 THERAPEUTIC EXERCISES: CPT | Performed by: PHYSICAL THERAPIST

## 2020-03-17 PROCEDURE — 97140 MANUAL THERAPY 1/> REGIONS: CPT | Performed by: PHYSICAL THERAPIST

## 2020-03-17 NOTE — PROGRESS NOTES
surgery        RESTRICTIONS/PRECAUTIONS: cervical fusion, HBP,  2 left shoulder surgeries (biceps tenodesis most recent)         Exercises/Interventions:   Therapeutic Ex (04248) Sets/sec Reps Notes/CUES   AD UE BIKE            STRETCHING      Pulleys 5min     Wand flexion 5sec 15    Wand ER supine at 45/90 5sec 15 each    UE [place]      Pendulum      Doorway      Wall Slides 5 sec  10       TP BB 10sec 10 Pulling across to the side  Then pulling up x5      SDLY Sleeper 10sec 10                ISOMETRICS      Gripping      Retraction      Abduction 5sec 10 Ball on wall   Flexion      Internal Rotation      External Rotation            STRENGTHENING-PREs      Flexion 3 10 Supine bilat punch and OH 3# in hand   Abduction      Internal Rotation      External Rotation-sidelying 3 10 2#   Shrugs      Biceps 3 10 7#   Triceps      Retraction      Extension      Horizontal Abduction in ER      Serratus 3 10 7#                     THERABANDS/CABLE COLUMN      Rows 3 10 Black   Lats 3 10 Black   Extension      Internal Rotation 3 10 Black     External Rotation 3 10 purple   Biceps      Triceps      PNF                                    Manual Intervention (00600)      Scar Massage      STM      Hawkgrips      GH joint mobilizations  x5' Long distraction/oscillations  Grade 1-2   Manual PROM all motions  x10'                NMR re-education (51523)   CUES NEEDED   Plyoback      Therabar oscillations      Body Blade       Rhythmic Stabilization      Supine AROM x30 all  Fw/bw, sd/sd, cw/ccw  5# weight in hand   Ball on the wall Up/down, sd/sd, Cw/ccw x30   4# plyoball   x10 each  2# ball \"box drill\" on wall and diagonal touches                   Therapeutic Activity (74093)      Core training      Swiss ball activities      Education                          Therapeutic Exercise and NMR EXR  [x] (63644) Provided verbal/tactile cueing for activities related to strengthening, flexibility, endurance, ROM  for improvements in scapular, scapulothoracic and UE control with self care, reaching, carrying, lifting, house/yardwork, driving/computer work. [x] (55209) Provided verbal/tactile cueing for activities related to improving balance, coordination, kinesthetic sense, posture, motor skill, proprioception  to assist with  scapular, scapulothoracic and UE control with self care, reaching, carrying, lifting, house/yardwork, driving/computer work. Therapeutic Activities:    [x] (93115 or 23863) Provided verbal/tactile cueing for activities related to improving balance, coordination, kinesthetic sense, posture, motor skill, proprioception and motor activation to allow for proper function of scapular, scapulothoracic and UE control with self care, carrying, lifting, driving/computer work.      Home Exercise Program:    [x] (33723) Reviewed/Progressed HEP activities related to strengthening, flexibility, endurance, ROM of scapular, scapulothoracic and UE control with self care, reaching, carrying, lifting, house/yardwork, driving/computer work  [] (49017) Reviewed/Progressed HEP activities related to improving balance, coordination, kinesthetic sense, posture, motor skill, proprioception of scapular, scapulothoracic and UE control with self care, reaching, carrying, lifting, house/yardwork, driving/computer work      Manual Treatments:  PROM / STM / Oscillations-Mobs:  G-I, II, III, IV (PA's, Inf., Post.)  [x] (84452) Provided manual therapy to mobilize soft tissue/joints of cervical/CT, scapular GHJ and UE for the purpose of modulating pain, promoting relaxation,  increasing ROM, reducing/eliminating soft tissue swelling/inflammation/restriction, improving soft tissue extensibility and allowing for proper ROM for normal function with self care, reaching, carrying, lifting, house/yardwork, driving/computer work    Modalities:      [] GR/ESU 15 min    [] GR 15 min   [] ESU     [x] CP x 15 min    [] MHP    [] declined    Charges:   TIME IN: 11:20 am        TIME OUT:    12:30      pm    Timed Code Treatment Minutes: 45   Total Treatment Minutes: 70     [] EVAL (LOW) 50081 (typically 20 minutes face-to-face)    [] EVAL (MOD) 14716 (typically 30 minutes face-to-face)  [] EVAL (HIGH) 51447 (typically 45 minutes face-to-face)  [] RE-EVAL     [x] PL(12117) x  1   11:20-11:40 [] IONTO   [x] NMR (52327) x  1    11:45-11:55       []   [x] Manual (70742) x  1  11:55-12:05    [] Other:    [] TA x       [] Mech Traction (74662)  [] ES(attended) (01962)      [] ES (un) (20253):     GOALS:  Patient stated goal: Able to use my arm. Therapist goals for Patient:   Short Term Goals: To be achieved in: 2 weeks  1. Independent in HEP and progression per patient tolerance, in order to prevent re-injury. -MET  2. Patient will have a decrease in pain to facilitate improvement in movement, function, and ADLs as indicated by Functional Deficits. -PROGRESSING    Long Term Goals: To be achieved in: 12 weeks  1. Disability index score of 35% or less for the [facility] to assist with reaching prior level of function. -PROGRESSING  2. Patient will demonstrate increased AROM to 150 flex, 90 ER, and 70 IR to allow for proper joint functioning as indicated by patients Functional Deficits. -PROGRESSING  3. Patient will demonstrate an increase in Strength to 4+/ 5 throughout left UE to allow for proper functional mobility as indicated by patients Functional Deficits. -PROGRESSING  4. Patient will return to dressing, driving, and maintain home without increased symptoms or restriction. -MOSTLY MET  5. Participate in work conditioning/ work hardening for safe return to work force. -WORKING TOWARDS/PROGRESSING  GOALS UPDATED 11/12/19  GOALS UPDATED 2/6/2020      Progression Towards Functional goals:   [] Patient is progressing as expected towards functional goals listed. [x] Progression is slowed due to complexities listed.  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 [] Plan just implemented, too soon to assess goals progression  [] Other:     ASSESSMENT:   We were able to increase a couple of his activities today with slightly more weight. Facial grimaces with some of his exercises. He still complains of his shoulder \"popping\" or shifting some upon release from an overhead stretch position. Treatment/Activity Tolerance:  [] Patient tolerated treatment well [x] Patient limited by fatique  [x] Patient limited by pain  [] Patient limited by other medical complications  [] Other:     Prognosis: [] Good [x] Fair  [] Poor    Patient Requires Follow-up: [x] Yes  [] No    PLAN: He will be continuing PT here at this office 2x/week. Progress resistance program as much as tolerated over the next month.       [x] Continue per plan of care [] Alter current plan (see comments)  [] Plan of care initiated [] Hold pending MD visit [] Discharge    Electronically signed by     Silver Chavez, [address] #[id number]

## 2020-03-19 ENCOUNTER — TREATMENT (OUTPATIENT)
Dept: PHYSICAL THERAPY | Age: 55
End: 2020-03-19
Payer: COMMERCIAL

## 2020-03-19 PROCEDURE — 97112 NEUROMUSCULAR REEDUCATION: CPT | Performed by: PHYSICAL THERAPIST

## 2020-03-19 PROCEDURE — 97110 THERAPEUTIC EXERCISES: CPT | Performed by: PHYSICAL THERAPIST

## 2020-03-19 PROCEDURE — 97140 MANUAL THERAPY 1/> REGIONS: CPT | Performed by: PHYSICAL THERAPIST

## 2020-03-19 NOTE — PROGRESS NOTES
[facility]   Phone: [phone]    Fax: [phone]        Physical Therapy Daily Treatment Note  Date:  3/19/2020    Patient Name:  Deepthi Alonzo    :  1965  MRN: <[id number]>  Restrictions/Precautions:    Physician Information:  Referring Practitioner: Dr. Belle Ruiz  Medical/Treatment Diagnosis Information:  · Diagnosis: M75.22 (ICD-10-CM) - Bicipital tendinitis, left shoulder   s/p LEFT SHOULDER   extensive debridement of labrum and rotator interval, arthroscopic lysis of subdeltoid subacromial adhesions, partial synovectomy, open subpectoral biceps tenodesis  DOS:  19  · Treatment Diagnosis:left shoulder pain  M25.512  [] Conservative / [x] Surgical - DOS: 19  Therapy Diagnosis/Practice Pattern:  Practice Pattern I: Bony or Soft Tissue Surgery  Insurance/Certification information:  PT Insurance Information: [facility]  Plan of care signed: [] YES  [] NO  Number of Comorbidities:  []0     [x]1-2    []3+    Date of Patient follow up with Physician:  3/31/2020     Pantera Sanchez  19    93% disability  [employer]  2020     61% disability      Progress Note: []  Yes  []  No  Next due by: Visit #10        Latex Allergy:  [x]NO      []YES  Preferred Language for Healthcare:   [x]English       []other:    Visit # Insurance Allowable Reporting Period   6 8  Begin Date:                End Date:  3/31/2020       SUBJECTIVE:   7+ months post op  Patient reports that he is still sore from his appointment on Tuesday and from last week as well. He rates that pain 4-5/10.         OBJECTIVE:        ROM PROM-supine AROM-standing  Comment    L R L R 2020   Flexion 165  170 145 165    Abduction 160 180 130 165    ER @ 90 80 110       BB/IR   T11-12 T8    OH/ER   T1-2 T4    Other             Strength:   Deltoid  IR  ER L  4+/5*  5-/5*  4/5* R  5/5  5/5  5/5 Comment  2020    *denotes pain with resistance       Special Tests Results/Comment: deferred secondary to surgery        RESTRICTIONS/PRECAUTIONS: cervical fusion, HBP,  2 left shoulder surgeries (biceps tenodesis most recent)         Exercises/Interventions:   Therapeutic Ex (30147) Sets/sec Reps Notes/CUES   AD UE BIKE            STRETCHING      Pulleys 5min     Wand flexion 5sec 15    Wand ER supine at 45/90 5sec 15 each    UE [place]      Pendulum      Doorway      Wall Slides 5 sec  10       TP BB 10sec 10 Pulling across to the side  Then pulling up x5      SDLY Sleeper 10sec 10                ISOMETRICS      Gripping      Retraction      Abduction Flexion      Internal Rotation      External Rotation            STRENGTHENING-PREs      Flexion 3 10 Supine bilat punch and OH 3# in hand   Abduction      Internal Rotation      External Rotation-sidelying 3 10 2#   Shrugs      Biceps 3 10 7#   Triceps      Retraction      Extension      Horizontal Abduction in ER      Serratus 3 10 7#                     THERABANDS/CABLE COLUMN      Rows 3 10 Black   Lats 3 10 Black   Extension      Internal Rotation 3 10 Black     External Rotation 3 10 purple   Biceps      Triceps      PNF                                    Manual Intervention (59157)      Scar Massage      STM: proximal L bicep  3'    Hawkgrips      GH joint mobilizations  x3' Long distraction/oscillations  Grade 1-2   Manual PROM all motions  x10'                NMR re-education (89219)   CUES NEEDED   Plyoback      Therabar oscillations      Body Blade       Rhythmic Stabilization      Supine AROM x30 all  Fw/bw, sd/sd, cw/ccw  5# weight in hand   Ball on the wall Up/down, sd/sd, Cw/ccw x30   4# plyoball   x10 each  2# ball \"box drill\" on wall and diagonal touches                   Therapeutic Activity (50466)      Core training      Swiss ball activities      Education                          Therapeutic Exercise and NMR EXR  [x] (14279) Provided verbal/tactile cueing for activities related to strengthening, flexibility, endurance, ROM  for improvements in

## 2020-03-20 ENCOUNTER — TELEPHONE (OUTPATIENT)
Dept: PHYSICAL THERAPY | Age: 55
End: 2020-03-20

## 2020-04-15 ENCOUNTER — TELEPHONE (OUTPATIENT)
Dept: ORTHOPEDIC SURGERY | Age: 55
End: 2020-04-15

## 2020-04-30 ENCOUNTER — TELEPHONE (OUTPATIENT)
Dept: ORTHOPEDIC SURGERY | Age: 55
End: 2020-04-30

## 2020-04-30 NOTE — TELEPHONE ENCOUNTER
LETTER SCANNED IN MEDIA    REQUEST PPD RATING ON [facility] [facility].     ONCE AVAILABLE / LET ME KNOW

## (undated) DEVICE — PAD,NON-ADHERENT,3X8,STERILE,LF,1/PK: Brand: MEDLINE

## (undated) DEVICE — SUTURE VCRL SZ 3-0 L27IN ABSRB UD L26MM CT-2 1/2 CIR J232H

## (undated) DEVICE — COVER,MAYO STAND,XL,STERILE: Brand: MEDLINE

## (undated) DEVICE — SUTURE FIBERWIRE SZ 2 W/ TAPERED NEEDLE BLUE L38IN NONABSORB BLU L26.5MM 1/2 CIRCLE AR7200

## (undated) DEVICE — 3M™ STERI-DRAPE™ U-DRAPE 1015: Brand: STERI-DRAPE™

## (undated) DEVICE — KIT SHLDR STBL MARCO FOR SPIDER LIMB POS

## (undated) DEVICE — INTENDED FOR TISSUE SEPARATION, AND OTHER PROCEDURES THAT REQUIRE A SHARP SURGICAL BLADE TO PUNCTURE OR CUT.: Brand: BARD-PARKER ® CARBON RIB-BACK BLADES

## (undated) DEVICE — KIT INSTR W/ 2.4MM GUIDEPIN SUT PASS WIRE NO2 FIBERWIRE

## (undated) DEVICE — STOCKINETTE ORTH W9XL36IN COT 2 PLY HLLW FOR HANDLING LMB

## (undated) DEVICE — PROBE ABLAT XL 90DEG ASPIR BPLR RF 1 PC ELECTRD ERGO HNDL

## (undated) DEVICE — INTENDED TO SUPPORT AND MAINTAIN THE POSITION OF AN ANESTHETIZED PATIENT DURING SURGERY: Brand: ERIN BEACH CHAIR FACE MASK

## (undated) DEVICE — PAD DRY FLOOR ABS 32X58IN GRN

## (undated) DEVICE — NEEDLE SPNL L3.5IN PNK HUB S STL REG WALL FIT STYL W/ QNCKE

## (undated) DEVICE — TUBING PMP L6FT CONT WAVE EXTN

## (undated) DEVICE — TUBING PMP L16FT MAIN DISP FOR AR-6400 AR-6475

## (undated) DEVICE — GLOVE SURG SZ 8 L12IN FNGR THK79MIL GRN LTX FREE

## (undated) DEVICE — GAUZE,SPONGE,4"X4",16PLY,XRAY,STRL,LF: Brand: MEDLINE

## (undated) DEVICE — DRAPE 70X60IN SPLIT IMPERV ADHES STRIP

## (undated) DEVICE — TUBING, SUCTION, 1/4" X 12', STRAIGHT: Brand: MEDLINE

## (undated) DEVICE — SURGICAL SET UP - SURE SET: Brand: MEDLINE INDUSTRIES, INC.

## (undated) DEVICE — HOLDER SCALP PLAS G STD

## (undated) DEVICE — GOWN,SIRUS,POLYRNF,BRTHSLV,XL,30/CS: Brand: MEDLINE

## (undated) DEVICE — TURNOVER KIT RM INF CTRL TECH

## (undated) DEVICE — SPONGE GZ W4XL8IN COT WVN 12 PLY

## (undated) DEVICE — EYE PROTECTOR FOAM MEDICHOICE

## (undated) DEVICE — BLADE SHV L13CM DIA5MM EXCALIBUR AGG COOLCUT

## (undated) DEVICE — SUTURE MCRYL SZ 4-0 L27IN ABSRB UD L19MM PS-2 1/2 CIR PRIM Y426H

## (undated) DEVICE — 3M™ IOBAN™ 2 ANTIMICROBIAL INCISE DRAPE 6640EZ: Brand: IOBAN™ 2

## (undated) DEVICE — CHLORAPREP 26ML ORANGE

## (undated) DEVICE — GOWN,REINFRCE,POLY,ECLIPSE,SLV,3XLG: Brand: MEDLINE

## (undated) DEVICE — BUR SHAVER 5 MMX13 CM 8 FLUT OVL FOR AGGRESSIVE BNE COOLCUT

## (undated) DEVICE — 3M™ COBAN™ NL STERILE NON-LATEX SELF-ADHERENT WRAP, 2084S, 4 IN X 5 YD (10 CM X 4,5 M), 18 ROLLS/CASE: Brand: 3M™ COBAN™

## (undated) DEVICE — COVER LT HNDL BLU PLAS

## (undated) DEVICE — DBD-PACK,SHOULDER III: Brand: MEDLINE

## (undated) DEVICE — 3M™ STERI-DRAPE™ U-DRAPE 1067 1067 5/BX 4BX/CS/CTN&#X20;: Brand: STERI-DRAPE™

## (undated) DEVICE — TOWEL,OR,DSP,ST,BLUE,DLX,8/PK,10PK/CS: Brand: MEDLINE

## (undated) DEVICE — STRAP SFTY W5XL72IN 1 PC

## (undated) DEVICE — SURE SET-DOUBLE BASIN-LF: Brand: MEDLINE INDUSTRIES, INC.

## (undated) DEVICE — PUDDLEVAC FLOOR SUCTION DEVICE: Brand: PUDDLEVAC

## (undated) DEVICE — SYSTEM SKIN CLSR 22CM DERMBND PRINEO

## (undated) DEVICE — TUBING FLD MGMT Y DBL SPIK DUALWAVE

## (undated) DEVICE — PAD,ABDOMINAL,5"X9",ST,LF,25/BX: Brand: MEDLINE INDUSTRIES, INC.

## (undated) DEVICE — SUTURE PDS II SZ 1 L27IN ABSRB VLT CT-1 L36MM 1/2 CIR Z341H

## (undated) DEVICE — PROTECTOR ULN NRV PUR FOAM HK LOOP STRP ANATOMICALLY

## (undated) DEVICE — 1010 S-DRAPE TOWEL DRAPE 10/BX: Brand: STERI-DRAPE™

## (undated) DEVICE — SOLUTION IV IRRIG LACTATED RINGERS 3000ML 2B7487

## (undated) DEVICE — GLOVE SURG SZ 8 L12IN FNGR THK75MIL WHT LTX POLYMER BEAD

## (undated) DEVICE — GOWN,SIRUS,POLYRNF,BRTHSLV,XLN/XXL,18/CS: Brand: MEDLINE

## (undated) DEVICE — 3M™ WARMING BLANKET, LOWER BODY, 10 PER CASE, 42568: Brand: BAIR HUGGER™

## (undated) DEVICE — GARMENT,MEDLINE,DVT,INT,CALF,MED, GEN2: Brand: MEDLINE

## (undated) DEVICE — PLATE ES AD W 9FT CRD 2

## (undated) DEVICE — CANNULA ARTHSCP L7CM DIA7MM TRNSLUC THRD FLX W/ NO SQUIRT

## (undated) DEVICE — SOLUTION IV 1000ML 0.9% SOD CHL